# Patient Record
Sex: FEMALE | URBAN - METROPOLITAN AREA
[De-identification: names, ages, dates, MRNs, and addresses within clinical notes are randomized per-mention and may not be internally consistent; named-entity substitution may affect disease eponyms.]

---

## 2019-12-05 ENCOUNTER — APPOINTMENT (RX ONLY)
Dept: URBAN - METROPOLITAN AREA CLINIC 151 | Facility: CLINIC | Age: 34
Setting detail: DERMATOLOGY
End: 2019-12-05

## 2019-12-05 DIAGNOSIS — L70.0 ACNE VULGARIS: ICD-10-CM | Status: INADEQUATELY CONTROLLED

## 2019-12-05 PROCEDURE — 99213 OFFICE O/P EST LOW 20 MIN: CPT

## 2019-12-05 PROCEDURE — ? PRESCRIPTION

## 2019-12-05 PROCEDURE — ? COUNSELING

## 2019-12-05 RX ORDER — SULFACETAMIDE SODIUM, SULFUR 98; 48 MG/G; MG/G
LOTION TOPICAL
Qty: 1 | Refills: 5 | Status: ERX | COMMUNITY
Start: 2019-12-05

## 2019-12-05 RX ORDER — HYDROQUINONE 4 %
CREAM (GRAM) TOPICAL
Qty: 1 | Refills: 3 | Status: ERX | COMMUNITY
Start: 2019-12-05

## 2019-12-05 RX ORDER — CLINDAMYCIN PHOSPHATE 10 MG/ML
SOLUTION TOPICAL
Qty: 1 | Refills: 6 | Status: ERX | COMMUNITY
Start: 2019-12-05

## 2019-12-05 RX ADMIN — Medication: at 00:00

## 2019-12-05 RX ADMIN — SULFACETAMIDE SODIUM, SULFUR: 98; 48 LOTION TOPICAL at 00:00

## 2019-12-05 RX ADMIN — CLINDAMYCIN PHOSPHATE: 10 SOLUTION TOPICAL at 00:00

## 2019-12-05 NOTE — HPI: PIMPLES (ACNE)
How Severe Is Your Acne?: mild
Is This A New Presentation, Or A Follow-Up?: Acne
Additional Comments (Use Complete Sentences): Patient uses unknown gel, and face wash.

## 2019-12-05 NOTE — PROCEDURE: COUNSELING
Topical Retinoid counseling:  Patient advised to apply a pea-sized amount only at bedtime and wait 30 minutes after washing their face before applying.  If too drying, patient may add a non-comedogenic moisturizer. The patient verbalized understanding of the proper use and possible adverse effects of retinoids.  All of the patient's questions and concerns were addressed.
Azithromycin Pregnancy And Lactation Text: This medication is considered safe during pregnancy and is also secreted in breast milk.
Spironolactone Pregnancy And Lactation Text: This medication can cause feminization of the male fetus and should be avoided during pregnancy. The active metabolite is also found in breast milk.
Birth Control Pills Pregnancy And Lactation Text: This medication should be avoided if pregnant and for the first 30 days post-partum.
High Dose Vitamin A Counseling: Side effects reviewed, pt to contact office should one occur.
High Dose Vitamin A Pregnancy And Lactation Text: High dose vitamin A therapy is contraindicated during pregnancy and breast feeding.
Patient Specific Counseling (Will Not Stick From Patient To Patient): Discussed potential treatments, patient is currently trying to get pregnant \\nStart clindamycin solution & hydroquinone \\nOnce she becomes pregnant will stop hydroquinone\\nDiscussed starting mike after pregnancy as it is safe to breastfeed with
Erythromycin Counseling:  I discussed with the patient the risks of erythromycin including but not limited to GI upset, allergic reaction, drug rash, diarrhea, increase in liver enzymes, and yeast infections.
Topical Clindamycin Pregnancy And Lactation Text: This medication is Pregnancy Category B and is considered safe during pregnancy. It is unknown if it is excreted in breast milk.
Include Pregnancy/Lactation Warning?: No
Topical Sulfur Applications Counseling: Topical Sulfur Counseling: Patient counseled that this medication may cause skin irritation or allergic reactions.  In the event of skin irritation, the patient was advised to reduce the amount of the drug applied or use it less frequently.   The patient verbalized understanding of the proper use and possible adverse effects of topical sulfur application.  All of the patient's questions and concerns were addressed.
Dapsone Counseling: I discussed with the patient the risks of dapsone including but not limited to hemolytic anemia, agranulocytosis, rashes, methemoglobinemia, kidney failure, peripheral neuropathy, headaches, GI upset, and liver toxicity.  Patients who start dapsone require monitoring including baseline LFTs and weekly CBCs for the first month, then every month thereafter.  The patient verbalized understanding of the proper use and possible adverse effects of dapsone.  All of the patient's questions and concerns were addressed.
Topical Retinoid Pregnancy And Lactation Text: This medication is Pregnancy Category C. It is unknown if this medication is excreted in breast milk.
Tetracycline Counseling: Patient counseled regarding possible photosensitivity and increased risk for sunburn.  Patient instructed to avoid sunlight, if possible.  When exposed to sunlight, patients should wear protective clothing, sunglasses, and sunscreen.  The patient was instructed to call the office immediately if the following severe adverse effects occur:  hearing changes, easy bruising/bleeding, severe headache, or vision changes.  The patient verbalized understanding of the proper use and possible adverse effects of tetracycline.  All of the patient's questions and concerns were addressed. Patient understands to avoid pregnancy while on therapy due to potential birth defects.
Tetracycline Pregnancy And Lactation Text: This medication is Pregnancy Category D and not consider safe during pregnancy. It is also excreted in breast milk.
Bactrim Counseling:  I discussed with the patient the risks of sulfa antibiotics including but not limited to GI upset, allergic reaction, drug rash, diarrhea, dizziness, photosensitivity, and yeast infections.  Rarely, more serious reactions can occur including but not limited to aplastic anemia, agranulocytosis, methemoglobinemia, blood dyscrasias, liver or kidney failure, lung infiltrates or desquamative/blistering drug rashes.
Dapsone Pregnancy And Lactation Text: This medication is Pregnancy Category C and is not considered safe during pregnancy or breast feeding.
Minocycline Counseling: Patient advised regarding possible photosensitivity and discoloration of the teeth, skin, lips, tongue and gums.  Patient instructed to avoid sunlight, if possible.  When exposed to sunlight, patients should wear protective clothing, sunglasses, and sunscreen.  The patient was instructed to call the office immediately if the following severe adverse effects occur:  hearing changes, easy bruising/bleeding, severe headache, or vision changes.  The patient verbalized understanding of the proper use and possible adverse effects of minocycline.  All of the patient's questions and concerns were addressed.
Erythromycin Pregnancy And Lactation Text: This medication is Pregnancy Category B and is considered safe during pregnancy. It is also excreted in breast milk.
Isotretinoin Counseling: Patient should get monthly blood tests, not donate blood, not drive at night if vision affected, not share medication, and not undergo elective surgery for 6 months after tx completed. Side effects reviewed, pt to contact office should one occur.
Topical Sulfur Applications Pregnancy And Lactation Text: This medication is Pregnancy Category C and has an unknown safety profile during pregnancy. It is unknown if this topical medication is excreted in breast milk.
Tazorac Counseling:  Patient advised that medication is irritating and drying.  Patient may need to apply sparingly and wash off after an hour before eventually leaving it on overnight.  The patient verbalized understanding of the proper use and possible adverse effects of tazorac.  All of the patient's questions and concerns were addressed.
Detail Level: Zone
Tazorac Pregnancy And Lactation Text: This medication is not safe during pregnancy. It is unknown if this medication is excreted in breast milk.
Bactrim Pregnancy And Lactation Text: This medication is Pregnancy Category D and is known to cause fetal risk.  It is also excreted in breast milk.
Benzoyl Peroxide Counseling: Patient counseled that medicine may cause skin irritation and bleach clothing.  In the event of skin irritation, the patient was advised to reduce the amount of the drug applied or use it less frequently.   The patient verbalized understanding of the proper use and possible adverse effects of benzoyl peroxide.  All of the patient's questions and concerns were addressed.
Spironolactone Counseling: Patient advised regarding risks of diarrhea, abdominal pain, hyperkalemia, birth defects (for female patients), liver toxicity and renal toxicity. The patient may need blood work to monitor liver and kidney function and potassium levels while on therapy. The patient verbalized understanding of the proper use and possible adverse effects of spironolactone.  All of the patient's questions and concerns were addressed.
Azithromycin Counseling:  I discussed with the patient the risks of azithromycin including but not limited to GI upset, allergic reaction, drug rash, diarrhea, and yeast infections.
Isotretinoin Pregnancy And Lactation Text: This medication is Pregnancy Category X and is considered extremely dangerous during pregnancy. It is unknown if it is excreted in breast milk.
Birth Control Pills Counseling: Birth Control Pill Counseling: I discussed with the patient the potential side effects of OCPs including but not limited to increased risk of stroke, heart attack, thrombophlebitis, deep venous thrombosis, hepatic adenomas, breast changes, GI upset, headaches, and depression.  The patient verbalized understanding of the proper use and possible adverse effects of OCPs. All of the patient's questions and concerns were addressed.
Doxycycline Counseling:  Patient counseled regarding possible photosensitivity and increased risk for sunburn.  Patient instructed to avoid sunlight, if possible.  When exposed to sunlight, patients should wear protective clothing, sunglasses, and sunscreen.  The patient was instructed to call the office immediately if the following severe adverse effects occur:  hearing changes, easy bruising/bleeding, severe headache, or vision changes.  The patient verbalized understanding of the proper use and possible adverse effects of doxycycline.  All of the patient's questions and concerns were addressed.
Doxycycline Pregnancy And Lactation Text: This medication is Pregnancy Category D and not consider safe during pregnancy. It is also excreted in breast milk but is considered safe for shorter treatment courses.
Topical Clindamycin Counseling: Patient counseled that this medication may cause skin irritation or allergic reactions.  In the event of skin irritation, the patient was advised to reduce the amount of the drug applied or use it less frequently.   The patient verbalized understanding of the proper use and possible adverse effects of clindamycin.  All of the patient's questions and concerns were addressed.
Benzoyl Peroxide Pregnancy And Lactation Text: This medication is Pregnancy Category C. It is unknown if benzoyl peroxide is excreted in breast milk.

## 2019-12-05 NOTE — PROCEDURE: MIPS QUALITY
Detail Level: Detailed
Quality 110: Preventive Care And Screening: Influenza Immunization: Influenza Immunization Ordered or Recommended, but not Administered due to system reason
Quality 431: Preventive Care And Screening: Unhealthy Alcohol Use - Screening: Patient screened for unhealthy alcohol use using a single question and scores less than 2 times per year
Quality 226: Preventive Care And Screening: Tobacco Use: Screening And Cessation Intervention: Patient screened for tobacco use and is an ex/non-smoker
Quality 130: Documentation Of Current Medications In The Medical Record: Current Medications Documented
Quality 131: Pain Assessment And Follow-Up: Pain assessment using a standardized tool is documented as negative, no follow-up plan required

## 2020-04-23 ENCOUNTER — RX ONLY (OUTPATIENT)
Age: 35
Setting detail: RX ONLY
End: 2020-04-23

## 2020-04-23 RX ORDER — SULFACETAMIDE SODIUM, SULFUR 98; 48 MG/G; MG/G
1 LIQUID TOPICAL QD
Qty: 1 | Refills: 2 | Status: ERX | COMMUNITY
Start: 2020-04-23

## 2021-05-27 ENCOUNTER — RX ONLY (OUTPATIENT)
Age: 36
Setting detail: RX ONLY
End: 2021-05-27

## 2021-05-27 ENCOUNTER — APPOINTMENT (RX ONLY)
Dept: URBAN - METROPOLITAN AREA CLINIC 151 | Facility: CLINIC | Age: 36
Setting detail: DERMATOLOGY
End: 2021-05-27

## 2021-05-27 DIAGNOSIS — L70.0 ACNE VULGARIS: ICD-10-CM | Status: INADEQUATELY CONTROLLED

## 2021-05-27 PROCEDURE — ? PRESCRIPTION MEDICATION MANAGEMENT

## 2021-05-27 PROCEDURE — ? DIAGNOSIS COMMENT

## 2021-05-27 PROCEDURE — ? COUNSELING

## 2021-05-27 PROCEDURE — ? PRESCRIPTION

## 2021-05-27 PROCEDURE — 99214 OFFICE O/P EST MOD 30 MIN: CPT

## 2021-05-27 RX ORDER — ERYTHROMYCIN 20 MG/G
GEL TOPICAL
Qty: 1 | Refills: 3 | Status: ERX | COMMUNITY
Start: 2021-05-27

## 2021-05-27 RX ORDER — AZELAIC ACID 0.15 G/G
AEROSOL, FOAM TOPICAL
Qty: 1 | Refills: 3 | Status: ERX | COMMUNITY
Start: 2021-05-27

## 2021-05-27 RX ORDER — AZELAIC ACID 0.15 G/G
GEL TOPICAL
Qty: 1 | Refills: 2 | Status: ERX | COMMUNITY
Start: 2021-05-27

## 2021-05-27 RX ADMIN — AZELAIC ACID: 0.15 AEROSOL, FOAM TOPICAL at 00:00

## 2021-05-27 RX ADMIN — ERYTHROMYCIN: 20 GEL TOPICAL at 00:00

## 2021-05-27 NOTE — PROCEDURE: MIPS QUALITY
Quality 226: Preventive Care And Screening: Tobacco Use: Screening And Cessation Intervention: Patient screened for tobacco use and is an ex/non-smoker
Quality 402: Tobacco Use And Help With Quitting Among Adolescents: Patient screened for tobacco and never smoked
Detail Level: Detailed
Quality 130: Documentation Of Current Medications In The Medical Record: Current Medications Documented
Quality 47: Advance Care Plan: Advance care planning not documented, reason not otherwise specified.
Quality 431: Preventive Care And Screening: Unhealthy Alcohol Use - Screening: Patient screened for unhealthy alcohol use using a single question and scores less than 2 times per year
Quality 134: Screening For Clinical Depression And Follow-Up Plan: The patient was screened for depression and the screen was negative and no follow up required
Quality 110: Preventive Care And Screening: Influenza Immunization: Influenza Immunization Administered during Influenza season

## 2021-05-27 NOTE — HPI: OTHER
Condition:: Acne
Please Describe Your Condition:: Acne/mask acne \\nPt notes breakouts correlated with menstrual cycle. New pimples daily, usually superficial, not cystic.  Pt is currently family planning. Pt notes hydroquinone cream in the past with no significant improvements and rx has . Combo skin per patient. Today is an average/bad day for acne. Pt is only using clindamycin solution, plexion face wash

## 2021-05-27 NOTE — PROCEDURE: COUNSELING
Benzoyl Peroxide Counseling: Patient counseled that medicine may cause skin irritation and bleach clothing.  In the event of skin irritation, the patient was advised to reduce the amount of the drug applied or use it less frequently.   The patient verbalized understanding of the proper use and possible adverse effects of benzoyl peroxide.  All of the patient's questions and concerns were addressed.
High Dose Vitamin A Pregnancy And Lactation Text: High dose vitamin A therapy is contraindicated during pregnancy and breast feeding.
Azithromycin Pregnancy And Lactation Text: This medication is considered safe during pregnancy and is also secreted in breast milk.
Topical Sulfur Applications Pregnancy And Lactation Text: This medication is Pregnancy Category C and has an unknown safety profile during pregnancy. It is unknown if this topical medication is excreted in breast milk.
Sarecycline Pregnancy And Lactation Text: This medication is Pregnancy Category D and not consider safe during pregnancy. It is also excreted in breast milk.
Birth Control Pills Pregnancy And Lactation Text: This medication should be avoided if pregnant and for the first 30 days post-partum.
Erythromycin Counseling:  I discussed with the patient the risks of erythromycin including but not limited to GI upset, allergic reaction, drug rash, diarrhea, increase in liver enzymes, and yeast infections.
Tazorac Counseling:  Patient advised that medication is irritating and drying.  Patient may need to apply sparingly and wash off after an hour before eventually leaving it on overnight.  The patient verbalized understanding of the proper use and possible adverse effects of tazorac.  All of the patient's questions and concerns were addressed.
High Dose Vitamin A Counseling: Side effects reviewed, pt to contact office should one occur.
Minocycline Counseling: Patient advised regarding possible photosensitivity and discoloration of the teeth, skin, lips, tongue and gums.  Patient instructed to avoid sunlight, if possible.  When exposed to sunlight, patients should wear protective clothing, sunglasses, and sunscreen.  The patient was instructed to call the office immediately if the following severe adverse effects occur:  hearing changes, easy bruising/bleeding, severe headache, or vision changes.  The patient verbalized understanding of the proper use and possible adverse effects of minocycline.  All of the patient's questions and concerns were addressed.
Dapsone Pregnancy And Lactation Text: This medication is Pregnancy Category C and is not considered safe during pregnancy or breast feeding.
Benzoyl Peroxide Pregnancy And Lactation Text: This medication is Pregnancy Category C. It is unknown if benzoyl peroxide is excreted in breast milk.
Erythromycin Pregnancy And Lactation Text: This medication is Pregnancy Category B and is considered safe during pregnancy. It is also excreted in breast milk.
Tazorac Pregnancy And Lactation Text: This medication is not safe during pregnancy. It is unknown if this medication is excreted in breast milk.
Bactrim Counseling:  I discussed with the patient the risks of sulfa antibiotics including but not limited to GI upset, allergic reaction, drug rash, diarrhea, dizziness, photosensitivity, and yeast infections.  Rarely, more serious reactions can occur including but not limited to aplastic anemia, agranulocytosis, methemoglobinemia, blood dyscrasias, liver or kidney failure, lung infiltrates or desquamative/blistering drug rashes.
Spironolactone Counseling: Patient advised regarding risks of diarrhea, abdominal pain, hyperkalemia, birth defects (for female patients), liver toxicity and renal toxicity. The patient may need blood work to monitor liver and kidney function and potassium levels while on therapy. The patient verbalized understanding of the proper use and possible adverse effects of spironolactone.  All of the patient's questions and concerns were addressed.
Dapsone Counseling: I discussed with the patient the risks of dapsone including but not limited to hemolytic anemia, agranulocytosis, rashes, methemoglobinemia, kidney failure, peripheral neuropathy, headaches, GI upset, and liver toxicity.  Patients who start dapsone require monitoring including baseline LFTs and weekly CBCs for the first month, then every month thereafter.  The patient verbalized understanding of the proper use and possible adverse effects of dapsone.  All of the patient's questions and concerns were addressed.
Doxycycline Counseling:  Patient counseled regarding possible photosensitivity and increased risk for sunburn.  Patient instructed to avoid sunlight, if possible.  When exposed to sunlight, patients should wear protective clothing, sunglasses, and sunscreen.  The patient was instructed to call the office immediately if the following severe adverse effects occur:  hearing changes, easy bruising/bleeding, severe headache, or vision changes.  The patient verbalized understanding of the proper use and possible adverse effects of doxycycline.  All of the patient's questions and concerns were addressed.
Use Enhanced Medication Counseling?: No
Topical Clindamycin Pregnancy And Lactation Text: This medication is Pregnancy Category B and is considered safe during pregnancy. It is unknown if it is excreted in breast milk.
Bactrim Pregnancy And Lactation Text: This medication is Pregnancy Category D and is known to cause fetal risk.  It is also excreted in breast milk.
Spironolactone Pregnancy And Lactation Text: This medication can cause feminization of the male fetus and should be avoided during pregnancy. The active metabolite is also found in breast milk.
Topical Retinoid counseling:  Patient advised to apply a pea-sized amount only at bedtime and wait 30 minutes after washing their face before applying.  If too drying, patient may add a non-comedogenic moisturizer. The patient verbalized understanding of the proper use and possible adverse effects of retinoids.  All of the patient's questions and concerns were addressed.
Isotretinoin Counseling: Patient should get monthly blood tests, not donate blood, not drive at night if vision affected, not share medication, and not undergo elective surgery for 6 months after tx completed. Side effects reviewed, pt to contact office should one occur.
Topical Clindamycin Counseling: Patient counseled that this medication may cause skin irritation or allergic reactions.  In the event of skin irritation, the patient was advised to reduce the amount of the drug applied or use it less frequently.   The patient verbalized understanding of the proper use and possible adverse effects of clindamycin.  All of the patient's questions and concerns were addressed.
Topical Sulfur Applications Counseling: Topical Sulfur Counseling: Patient counseled that this medication may cause skin irritation or allergic reactions.  In the event of skin irritation, the patient was advised to reduce the amount of the drug applied or use it less frequently.   The patient verbalized understanding of the proper use and possible adverse effects of topical sulfur application.  All of the patient's questions and concerns were addressed.
Doxycycline Pregnancy And Lactation Text: This medication is Pregnancy Category D and not consider safe during pregnancy. It is also excreted in breast milk but is considered safe for shorter treatment courses.
Azithromycin Counseling:  I discussed with the patient the risks of azithromycin including but not limited to GI upset, allergic reaction, drug rash, diarrhea, and yeast infections.
Topical Retinoid Pregnancy And Lactation Text: This medication is Pregnancy Category C. It is unknown if this medication is excreted in breast milk.
Isotretinoin Pregnancy And Lactation Text: This medication is Pregnancy Category X and is considered extremely dangerous during pregnancy. It is unknown if it is excreted in breast milk.
Birth Control Pills Counseling: Birth Control Pill Counseling: I discussed with the patient the potential side effects of OCPs including but not limited to increased risk of stroke, heart attack, thrombophlebitis, deep venous thrombosis, hepatic adenomas, breast changes, GI upset, headaches, and depression.  The patient verbalized understanding of the proper use and possible adverse effects of OCPs. All of the patient's questions and concerns were addressed.
Sarecycline Counseling: Patient advised regarding possible photosensitivity and discoloration of the teeth, skin, lips, tongue and gums.  Patient instructed to avoid sunlight, if possible.  When exposed to sunlight, patients should wear protective clothing, sunglasses, and sunscreen.  The patient was instructed to call the office immediately if the following severe adverse effects occur:  hearing changes, easy bruising/bleeding, severe headache, or vision changes.  The patient verbalized understanding of the proper use and possible adverse effects of sarecycline.  All of the patient's questions and concerns were addressed.
Detail Level: Detailed
Tetracycline Counseling: Patient counseled regarding possible photosensitivity and increased risk for sunburn.  Patient instructed to avoid sunlight, if possible.  When exposed to sunlight, patients should wear protective clothing, sunglasses, and sunscreen.  The patient was instructed to call the office immediately if the following severe adverse effects occur:  hearing changes, easy bruising/bleeding, severe headache, or vision changes.  The patient verbalized understanding of the proper use and possible adverse effects of tetracycline.  All of the patient's questions and concerns were addressed. Patient understands to avoid pregnancy while on therapy due to potential birth defects.

## 2021-05-27 NOTE — PROCEDURE: DIAGNOSIS COMMENT
Comment: Mild activity but diffuse pigmentary changes. Pt denies frequent cystic lesions. Patient is pregnancy planning. Referred to [facility] for facials
Detail Level: Zone
Render Risk Assessment In Note?: no

## 2021-05-27 NOTE — PROCEDURE: PRESCRIPTION MEDICATION MANAGEMENT
Render In Strict Bullet Format?: No
Samples Given: ALEXX Anthelios Tinted sunscreen  SPF50, CeraVe Hydrating Tinted sunscreen SPF30, Finacea
Initiate Treatment: Finacea foam BID\\nErythromycin gel BID
Detail Level: Zone

## 2021-07-29 ENCOUNTER — APPOINTMENT (RX ONLY)
Dept: URBAN - METROPOLITAN AREA CLINIC 151 | Facility: CLINIC | Age: 36
Setting detail: DERMATOLOGY
End: 2021-07-29

## 2021-07-29 DIAGNOSIS — L70.0 ACNE VULGARIS: ICD-10-CM | Status: STABLE

## 2021-07-29 PROCEDURE — 99214 OFFICE O/P EST MOD 30 MIN: CPT

## 2021-07-29 PROCEDURE — ? PRESCRIPTION

## 2021-07-29 PROCEDURE — ? DIAGNOSIS COMMENT

## 2021-07-29 PROCEDURE — ? PRESCRIPTION MEDICATION MANAGEMENT

## 2021-07-29 PROCEDURE — ? COUNSELING

## 2021-07-29 RX ORDER — ADAPALENE AND BENZOYL PEROXIDE 3; 25 MG/G; MG/G
GEL TOPICAL
Qty: 1 | Refills: 5 | Status: ERX | COMMUNITY
Start: 2021-07-29

## 2021-07-29 RX ADMIN — ADAPALENE AND BENZOYL PEROXIDE: 3; 25 GEL TOPICAL at 00:00

## 2021-07-29 NOTE — PROCEDURE: DIAGNOSIS COMMENT
Comment: Mild. Patient notes more frequent breakouts. Patient to discontinue erythromycin 2% gel (notes filmy cast after application). Patient has seen Sarai Baldwin for facials. Finacea foam not covered by insurance thus was substituted with azelaic acid 15% gel- will continue and add Epiduo Forte gel QHS as tolerated (application instructions reviewed, patient aware to discontinue if pregnant). Patient has also been using Plexion cleanser in the morning noting that she likes the exfoliating texture
Detail Level: Zone
Render Risk Assessment In Note?: no

## 2021-07-29 NOTE — PROCEDURE: PRESCRIPTION MEDICATION MANAGEMENT
Discontinue Regimen: Erythromycin gel BID
Render In Strict Bullet Format?: No
Samples Given: Epiduo Forte, Finacea Foam, Neutrogena Gentle cleanser, LRP gentle cleanser, Cetaphil gentle , CeraVe PM moisturizer (patient traveling)
Continue Regimen: Finacea foam BID
Initiate Treatment: Epiduo Forte gel nightly as tolerated
Detail Level: Zone

## 2021-09-23 ENCOUNTER — APPOINTMENT (RX ONLY)
Dept: URBAN - METROPOLITAN AREA CLINIC 151 | Facility: CLINIC | Age: 36
Setting detail: DERMATOLOGY
End: 2021-09-23

## 2021-09-23 DIAGNOSIS — L70.0 ACNE VULGARIS: ICD-10-CM

## 2021-09-23 PROCEDURE — ? PRESCRIPTION MEDICATION MANAGEMENT

## 2021-09-23 PROCEDURE — 99214 OFFICE O/P EST MOD 30 MIN: CPT | Mod: 95

## 2021-09-23 PROCEDURE — ? DIAGNOSIS COMMENT

## 2021-09-23 PROCEDURE — ? CONSENT FOR TELEMEDICINE VISIT OBTAINED

## 2021-09-23 PROCEDURE — ? COUNSELING

## 2021-09-23 PROCEDURE — ? REASON FOR TELEMEDICINE VISIT

## 2021-09-23 NOTE — PROCEDURE: PRESCRIPTION MEDICATION MANAGEMENT
Render In Strict Bullet Format?: No
Plan: Patient to continue alternating Azelaic acid gel and Epiduo Forte gel at night
Continue Regimen: Azelaic acid gel QD\\nEpiduo Forte gel QOHS as tolerated
Detail Level: Zone

## 2021-09-23 NOTE — PROCEDURE: DIAGNOSIS COMMENT
Comment: Mild, improved. Patient has been doing well with Azelaic Acid 15% gel and Epiduo Forte gel (patient alternating applications nightly as she is unable to tolerate Epiduo Forte nightly). Patient is aware to discontinue Epiduo Forte gel once pregnant as she is pregnancy planning. She is currently using La Roche Posay Toleraine hydrating cleanser- due to dryer skin, recommended Eucerin Eczema Relief body cream as facial moisturizer
Detail Level: Zone
Render Risk Assessment In Note?: no

## 2021-11-08 PROBLEM — Z31.41 FERTILITY TESTING: Status: ACTIVE | Noted: 2021-11-08

## 2021-11-08 PROBLEM — E04.9 ENLARGED THYROID: Status: ACTIVE | Noted: 2021-11-08

## 2022-01-05 PROBLEM — E03.1 CONGENITAL HYPOTHYROIDISM WITHOUT GOITER: Status: ACTIVE | Noted: 2022-01-05

## 2022-01-05 PROBLEM — E66.9 OBESITY (BMI 35.0-39.9 WITHOUT COMORBIDITY): Status: ACTIVE | Noted: 2022-01-05

## 2022-04-05 ENCOUNTER — LAB ENCOUNTER (OUTPATIENT)
Dept: LAB | Facility: HOSPITAL | Age: 37
End: 2022-04-05
Attending: INTERNAL MEDICINE
Payer: COMMERCIAL

## 2022-04-05 DIAGNOSIS — R63.5 ABNORMAL WEIGHT GAIN: ICD-10-CM

## 2022-04-05 DIAGNOSIS — K90.41 GLUTEN INTOLERANCE: ICD-10-CM

## 2022-04-05 PROBLEM — E88.81 INSULIN RESISTANCE: Status: ACTIVE | Noted: 2022-04-05

## 2022-04-05 PROBLEM — R73.03 PREDIABETES: Status: ACTIVE | Noted: 2022-04-05

## 2022-04-05 PROBLEM — E88.819 INSULIN RESISTANCE: Status: ACTIVE | Noted: 2022-04-05

## 2022-04-05 LAB
ALBUMIN SERPL-MCNC: 3.8 G/DL (ref 3.4–5)
ALBUMIN/GLOB SERPL: 1.1 {RATIO} (ref 1–2)
ALP LIVER SERPL-CCNC: 71 U/L
ALT SERPL-CCNC: 27 U/L
ANION GAP SERPL CALC-SCNC: 3 MMOL/L (ref 0–18)
AST SERPL-CCNC: 25 U/L (ref 15–37)
BASOPHILS # BLD AUTO: 0.1 X10(3) UL (ref 0–0.2)
BASOPHILS NFR BLD AUTO: 1.8 %
BILIRUB SERPL-MCNC: 0.6 MG/DL (ref 0.1–2)
BUN BLD-MCNC: 11 MG/DL (ref 7–18)
BUN/CREAT SERPL: 10.3 (ref 10–20)
CALCIUM BLD-MCNC: 9.3 MG/DL (ref 8.5–10.1)
CHLORIDE SERPL-SCNC: 107 MMOL/L (ref 98–112)
CO2 SERPL-SCNC: 27 MMOL/L (ref 21–32)
CREAT BLD-MCNC: 1.07 MG/DL
DEPRECATED RDW RBC AUTO: 45.7 FL (ref 35.1–46.3)
EOSINOPHIL # BLD AUTO: 0.1 X10(3) UL (ref 0–0.7)
EOSINOPHIL NFR BLD AUTO: 1.8 %
ERYTHROCYTE [DISTWIDTH] IN BLOOD BY AUTOMATED COUNT: 14.5 % (ref 11–15)
EST. AVERAGE GLUCOSE BLD GHB EST-MCNC: 123 MG/DL (ref 68–126)
FASTING STATUS PATIENT QL REPORTED: YES
GLOBULIN PLAS-MCNC: 3.6 G/DL (ref 2.8–4.4)
GLUCOSE BLD-MCNC: 94 MG/DL (ref 70–99)
HBA1C MFR BLD: 5.9 % (ref ?–5.7)
HCT VFR BLD AUTO: 41.8 %
HGB BLD-MCNC: 13.9 G/DL
IMM GRANULOCYTES # BLD AUTO: 0.02 X10(3) UL (ref 0–1)
IMM GRANULOCYTES NFR BLD: 0.4 %
INSULIN SERPL-ACNC: 20.5 MU/L (ref 3–25)
LYMPHOCYTES # BLD AUTO: 2.15 X10(3) UL (ref 1–4)
LYMPHOCYTES NFR BLD AUTO: 39.2 %
MCH RBC QN AUTO: 29 PG (ref 26–34)
MCHC RBC AUTO-ENTMCNC: 33.3 G/DL (ref 31–37)
MCV RBC AUTO: 87.1 FL
MONOCYTES # BLD AUTO: 0.38 X10(3) UL (ref 0.1–1)
MONOCYTES NFR BLD AUTO: 6.9 %
NEUTROPHILS # BLD AUTO: 2.74 X10 (3) UL (ref 1.5–7.7)
NEUTROPHILS # BLD AUTO: 2.74 X10(3) UL (ref 1.5–7.7)
NEUTROPHILS NFR BLD AUTO: 49.9 %
OSMOLALITY SERPL CALC.SUM OF ELEC: 283 MOSM/KG (ref 275–295)
PLATELET # BLD AUTO: 248 10(3)UL (ref 150–450)
POTASSIUM SERPL-SCNC: 4.2 MMOL/L (ref 3.5–5.1)
PROT SERPL-MCNC: 7.4 G/DL (ref 6.4–8.2)
RBC # BLD AUTO: 4.8 X10(6)UL
SODIUM SERPL-SCNC: 137 MMOL/L (ref 136–145)
TSI SER-ACNC: 1.05 MIU/ML (ref 0.36–3.74)
WBC # BLD AUTO: 5.5 X10(3) UL (ref 4–11)

## 2022-04-05 PROCEDURE — 80053 COMPREHEN METABOLIC PANEL: CPT

## 2022-04-05 PROCEDURE — 36415 COLL VENOUS BLD VENIPUNCTURE: CPT

## 2022-04-05 PROCEDURE — 86003 ALLG SPEC IGE CRUDE XTRC EA: CPT

## 2022-04-05 PROCEDURE — 83525 ASSAY OF INSULIN: CPT

## 2022-04-05 PROCEDURE — 83036 HEMOGLOBIN GLYCOSYLATED A1C: CPT

## 2022-04-05 PROCEDURE — 84443 ASSAY THYROID STIM HORMONE: CPT

## 2022-04-05 PROCEDURE — 82397 CHEMILUMINESCENT ASSAY: CPT

## 2022-04-05 PROCEDURE — 85025 COMPLETE CBC W/AUTO DIFF WBC: CPT

## 2022-04-05 PROCEDURE — 86364 TISS TRNSGLTMNASE EA IG CLAS: CPT

## 2022-04-05 PROCEDURE — 86256 FLUORESCENT ANTIBODY TITER: CPT

## 2022-04-08 LAB
LEPTIN: 34.9 NG/ML
TTG IGA SER-ACNC: 0.2 U/ML (ref ?–7)
WHEAT IGE QN: <0.1 KUA/L (ref ?–0.1)

## 2023-01-18 LAB
AMB EXT HEMATOCRIT: 37.4
AMB EXT HEMOGLOBIN: 12.9
AMB EXT PLATELETS: 263
AMB EXT TREPONEMAL ANTIBODIES: NEGATIVE
ANTIBODY SCREEN OB: NEGATIVE
HEPATITIS B SURFACE ANTIGEN OB: NEGATIVE
HIV RESULT OB: NEGATIVE
RH FACTOR OB: POSITIVE

## 2023-06-30 LAB
AMB EXT HEMATOCRIT: 36.4
AMB EXT HEMOGLOBIN: 12
HIV RESULT OB: NEGATIVE

## 2023-07-31 LAB — STREP GP B CULT OB: NEGATIVE

## 2023-08-09 ENCOUNTER — TELEPHONE (OUTPATIENT)
Dept: OBGYN UNIT | Facility: HOSPITAL | Age: 38
End: 2023-08-09

## 2023-08-11 ENCOUNTER — ANESTHESIA EVENT (OUTPATIENT)
Dept: OBGYN UNIT | Facility: HOSPITAL | Age: 38
End: 2023-08-11
Payer: COMMERCIAL

## 2023-08-11 ENCOUNTER — ANESTHESIA (OUTPATIENT)
Dept: OBGYN UNIT | Facility: HOSPITAL | Age: 38
End: 2023-08-11
Payer: COMMERCIAL

## 2023-08-11 ENCOUNTER — HOSPITAL ENCOUNTER (INPATIENT)
Facility: HOSPITAL | Age: 38
LOS: 4 days | Discharge: HOME | End: 2023-08-15
Attending: OBSTETRICS & GYNECOLOGY | Admitting: OBSTETRICS & GYNECOLOGY
Payer: COMMERCIAL

## 2023-08-11 PROBLEM — Z34.90 PREGNANCY: Status: ACTIVE | Noted: 2023-08-11

## 2023-08-11 PROBLEM — Z34.90 PREGNANCY (HCC): Status: ACTIVE | Noted: 2023-08-11

## 2023-08-11 LAB
ANTIBODY SCREEN: NEGATIVE
BASOPHILS # BLD AUTO: 0.07 X10(3) UL (ref 0–0.2)
BASOPHILS NFR BLD AUTO: 0.7 %
DEPRECATED RDW RBC AUTO: 44.1 FL (ref 35.1–46.3)
EOSINOPHIL # BLD AUTO: 0.11 X10(3) UL (ref 0–0.7)
EOSINOPHIL NFR BLD AUTO: 1.2 %
ERYTHROCYTE [DISTWIDTH] IN BLOOD BY AUTOMATED COUNT: 14.2 % (ref 11–15)
GLUCOSE BLDC GLUCOMTR-MCNC: 170 MG/DL (ref 70–99)
HCT VFR BLD AUTO: 36.8 %
HGB BLD-MCNC: 12.9 G/DL
IMM GRANULOCYTES # BLD AUTO: 0.1 X10(3) UL (ref 0–1)
IMM GRANULOCYTES NFR BLD: 1.1 %
LYMPHOCYTES # BLD AUTO: 2.04 X10(3) UL (ref 1–4)
LYMPHOCYTES NFR BLD AUTO: 21.7 %
MCH RBC QN AUTO: 29.9 PG (ref 26–34)
MCHC RBC AUTO-ENTMCNC: 35.1 G/DL (ref 31–37)
MCV RBC AUTO: 85.4 FL
MONOCYTES # BLD AUTO: 0.94 X10(3) UL (ref 0.1–1)
MONOCYTES NFR BLD AUTO: 10 %
NEUTROPHILS # BLD AUTO: 6.12 X10 (3) UL (ref 1.5–7.7)
NEUTROPHILS # BLD AUTO: 6.12 X10(3) UL (ref 1.5–7.7)
NEUTROPHILS NFR BLD AUTO: 65.3 %
PLATELET # BLD AUTO: 239 10(3)UL (ref 150–450)
RBC # BLD AUTO: 4.31 X10(6)UL
RH BLOOD TYPE: POSITIVE
WBC # BLD AUTO: 9.4 X10(3) UL (ref 4–11)

## 2023-08-11 PROCEDURE — S0028 INJECTION, FAMOTIDINE, 20 MG: HCPCS | Performed by: OBSTETRICS & GYNECOLOGY

## 2023-08-11 PROCEDURE — 86901 BLOOD TYPING SEROLOGIC RH(D): CPT | Performed by: OBSTETRICS & GYNECOLOGY

## 2023-08-11 PROCEDURE — 86850 RBC ANTIBODY SCREEN: CPT | Performed by: OBSTETRICS & GYNECOLOGY

## 2023-08-11 PROCEDURE — 88307 TISSUE EXAM BY PATHOLOGIST: CPT | Performed by: OBSTETRICS & GYNECOLOGY

## 2023-08-11 PROCEDURE — 85025 COMPLETE CBC W/AUTO DIFF WBC: CPT | Performed by: OBSTETRICS & GYNECOLOGY

## 2023-08-11 PROCEDURE — 82962 GLUCOSE BLOOD TEST: CPT

## 2023-08-11 PROCEDURE — 86900 BLOOD TYPING SEROLOGIC ABO: CPT | Performed by: OBSTETRICS & GYNECOLOGY

## 2023-08-11 RX ORDER — IBUPROFEN 600 MG/1
600 TABLET ORAL EVERY 6 HOURS
Status: DISCONTINUED | OUTPATIENT
Start: 2023-08-12 | End: 2023-08-15

## 2023-08-11 RX ORDER — METOCLOPRAMIDE 10 MG/1
10 TABLET ORAL ONCE
Status: COMPLETED | OUTPATIENT
Start: 2023-08-11 | End: 2023-08-11

## 2023-08-11 RX ORDER — MORPHINE SULFATE 1 MG/ML
INJECTION, SOLUTION EPIDURAL; INTRATHECAL; INTRAVENOUS
Status: COMPLETED | OUTPATIENT
Start: 2023-08-11 | End: 2023-08-11

## 2023-08-11 RX ORDER — AMMONIA INHALANTS 0.04 G/.3ML
0.3 INHALANT RESPIRATORY (INHALATION) AS NEEDED
Status: DISCONTINUED | OUTPATIENT
Start: 2023-08-11 | End: 2023-08-15

## 2023-08-11 RX ORDER — DIPHENHYDRAMINE HCL 25 MG
25 CAPSULE ORAL EVERY 4 HOURS PRN
Status: DISPENSED | OUTPATIENT
Start: 2023-08-11 | End: 2023-08-12

## 2023-08-11 RX ORDER — DIPHENHYDRAMINE HYDROCHLORIDE 50 MG/ML
25 INJECTION INTRAMUSCULAR; INTRAVENOUS ONCE AS NEEDED
Status: ACTIVE | OUTPATIENT
Start: 2023-08-11 | End: 2023-08-11

## 2023-08-11 RX ORDER — SIMETHICONE 80 MG
80 TABLET,CHEWABLE ORAL 3 TIMES DAILY PRN
Status: DISCONTINUED | OUTPATIENT
Start: 2023-08-11 | End: 2023-08-15

## 2023-08-11 RX ORDER — ONDANSETRON 2 MG/ML
4 INJECTION INTRAMUSCULAR; INTRAVENOUS EVERY 6 HOURS PRN
Status: DISCONTINUED | OUTPATIENT
Start: 2023-08-11 | End: 2023-08-11 | Stop reason: HOSPADM

## 2023-08-11 RX ORDER — TRISODIUM CITRATE DIHYDRATE AND CITRIC ACID MONOHYDRATE 500; 334 MG/5ML; MG/5ML
30 SOLUTION ORAL ONCE
Status: COMPLETED | OUTPATIENT
Start: 2023-08-11 | End: 2023-08-11

## 2023-08-11 RX ORDER — DEXTROSE, SODIUM CHLORIDE, SODIUM LACTATE, POTASSIUM CHLORIDE, AND CALCIUM CHLORIDE 5; .6; .31; .03; .02 G/100ML; G/100ML; G/100ML; G/100ML; G/100ML
INJECTION, SOLUTION INTRAVENOUS CONTINUOUS
Status: DISCONTINUED | OUTPATIENT
Start: 2023-08-11 | End: 2023-08-15

## 2023-08-11 RX ORDER — OXYCODONE HYDROCHLORIDE 5 MG/1
5 TABLET ORAL EVERY 6 HOURS PRN
Status: DISCONTINUED | OUTPATIENT
Start: 2023-08-11 | End: 2023-08-15

## 2023-08-11 RX ORDER — ASPIRIN 81 MG/1
81 TABLET ORAL DAILY
COMMUNITY
End: 2023-08-15

## 2023-08-11 RX ORDER — FAMOTIDINE 20 MG/1
20 TABLET, FILM COATED ORAL ONCE
Status: COMPLETED | OUTPATIENT
Start: 2023-08-11 | End: 2023-08-11

## 2023-08-11 RX ORDER — SODIUM CHLORIDE, SODIUM LACTATE, POTASSIUM CHLORIDE, CALCIUM CHLORIDE 600; 310; 30; 20 MG/100ML; MG/100ML; MG/100ML; MG/100ML
INJECTION, SOLUTION INTRAVENOUS CONTINUOUS
Status: DISCONTINUED | OUTPATIENT
Start: 2023-08-11 | End: 2023-08-15

## 2023-08-11 RX ORDER — FAMOTIDINE 10 MG/ML
20 INJECTION, SOLUTION INTRAVENOUS ONCE
Status: COMPLETED | OUTPATIENT
Start: 2023-08-11 | End: 2023-08-11

## 2023-08-11 RX ORDER — SODIUM CHLORIDE, SODIUM LACTATE, POTASSIUM CHLORIDE, CALCIUM CHLORIDE 600; 310; 30; 20 MG/100ML; MG/100ML; MG/100ML; MG/100ML
125 INJECTION, SOLUTION INTRAVENOUS CONTINUOUS
Status: DISCONTINUED | OUTPATIENT
Start: 2023-08-11 | End: 2023-08-11 | Stop reason: HOSPADM

## 2023-08-11 RX ORDER — NALOXONE HYDROCHLORIDE 0.4 MG/ML
0.08 INJECTION, SOLUTION INTRAMUSCULAR; INTRAVENOUS; SUBCUTANEOUS
Status: ACTIVE | OUTPATIENT
Start: 2023-08-11 | End: 2023-08-12

## 2023-08-11 RX ORDER — LEVOTHYROXINE SODIUM 0.05 MG/1
50 TABLET ORAL
Status: DISCONTINUED | OUTPATIENT
Start: 2023-08-11 | End: 2023-08-15

## 2023-08-11 RX ORDER — ACETAMINOPHEN 500 MG
1000 TABLET ORAL ONCE
Status: COMPLETED | OUTPATIENT
Start: 2023-08-11 | End: 2023-08-11

## 2023-08-11 RX ORDER — DOCUSATE SODIUM 100 MG/1
100 CAPSULE, LIQUID FILLED ORAL
Status: DISCONTINUED | OUTPATIENT
Start: 2023-08-11 | End: 2023-08-15

## 2023-08-11 RX ORDER — BISACODYL 10 MG
10 SUPPOSITORY, RECTAL RECTAL ONCE AS NEEDED
Status: DISCONTINUED | OUTPATIENT
Start: 2023-08-11 | End: 2023-08-15

## 2023-08-11 RX ORDER — ACETAMINOPHEN 500 MG
1000 TABLET ORAL EVERY 6 HOURS
Status: DISCONTINUED | OUTPATIENT
Start: 2023-08-11 | End: 2023-08-15

## 2023-08-11 RX ORDER — METOCLOPRAMIDE HYDROCHLORIDE 5 MG/ML
10 INJECTION INTRAMUSCULAR; INTRAVENOUS ONCE
Status: COMPLETED | OUTPATIENT
Start: 2023-08-11 | End: 2023-08-11

## 2023-08-11 RX ORDER — CEFAZOLIN SODIUM/WATER 2 G/20 ML
2 SYRINGE (ML) INTRAVENOUS ONCE
Status: COMPLETED | OUTPATIENT
Start: 2023-08-11 | End: 2023-08-11

## 2023-08-11 RX ORDER — PROCHLORPERAZINE EDISYLATE 5 MG/ML
5 INJECTION INTRAMUSCULAR; INTRAVENOUS ONCE AS NEEDED
Status: ACTIVE | OUTPATIENT
Start: 2023-08-11 | End: 2023-08-11

## 2023-08-11 RX ORDER — NALBUPHINE HYDROCHLORIDE 10 MG/ML
2.5 INJECTION, SOLUTION INTRAMUSCULAR; INTRAVENOUS; SUBCUTANEOUS EVERY 4 HOURS PRN
Status: ACTIVE | OUTPATIENT
Start: 2023-08-11 | End: 2023-08-12

## 2023-08-11 RX ORDER — HYDROCODONE BITARTRATE AND ACETAMINOPHEN 7.5; 325 MG/1; MG/1
2 TABLET ORAL EVERY 6 HOURS PRN
Status: ACTIVE | OUTPATIENT
Start: 2023-08-11 | End: 2023-08-12

## 2023-08-11 RX ORDER — LIDOCAINE HYDROCHLORIDE 10 MG/ML
INJECTION, SOLUTION INFILTRATION; PERINEURAL
Status: COMPLETED | OUTPATIENT
Start: 2023-08-11 | End: 2023-08-11

## 2023-08-11 RX ORDER — ONDANSETRON 2 MG/ML
4 INJECTION INTRAMUSCULAR; INTRAVENOUS ONCE AS NEEDED
Status: ACTIVE | OUTPATIENT
Start: 2023-08-11 | End: 2023-08-11

## 2023-08-11 RX ORDER — KETOROLAC TROMETHAMINE 30 MG/ML
30 INJECTION, SOLUTION INTRAMUSCULAR; INTRAVENOUS EVERY 6 HOURS
Status: DISPENSED | OUTPATIENT
Start: 2023-08-11 | End: 2023-08-12

## 2023-08-11 RX ORDER — CHOLECALCIFEROL (VITAMIN D3) 25 MCG
1 TABLET,CHEWABLE ORAL DAILY
Status: DISCONTINUED | OUTPATIENT
Start: 2023-08-11 | End: 2023-08-15

## 2023-08-11 RX ORDER — EPHEDRINE SULFATE 50 MG/ML
INJECTION INTRAVENOUS AS NEEDED
Status: DISCONTINUED | OUTPATIENT
Start: 2023-08-11 | End: 2023-08-11 | Stop reason: SURG

## 2023-08-11 RX ORDER — NALBUPHINE HYDROCHLORIDE 10 MG/ML
2.5 INJECTION, SOLUTION INTRAMUSCULAR; INTRAVENOUS; SUBCUTANEOUS
Status: DISCONTINUED | OUTPATIENT
Start: 2023-08-11 | End: 2023-08-11

## 2023-08-11 RX ORDER — KETOROLAC TROMETHAMINE 30 MG/ML
30 INJECTION, SOLUTION INTRAMUSCULAR; INTRAVENOUS ONCE AS NEEDED
Status: COMPLETED | OUTPATIENT
Start: 2023-08-11 | End: 2023-08-11

## 2023-08-11 RX ORDER — HYDROMORPHONE HYDROCHLORIDE 1 MG/ML
0.6 INJECTION, SOLUTION INTRAMUSCULAR; INTRAVENOUS; SUBCUTANEOUS
Status: ACTIVE | OUTPATIENT
Start: 2023-08-11 | End: 2023-08-12

## 2023-08-11 RX ORDER — HYDROCODONE BITARTRATE AND ACETAMINOPHEN 7.5; 325 MG/1; MG/1
1 TABLET ORAL EVERY 6 HOURS PRN
Status: ACTIVE | OUTPATIENT
Start: 2023-08-11 | End: 2023-08-12

## 2023-08-11 RX ORDER — DIPHENHYDRAMINE HYDROCHLORIDE 50 MG/ML
12.5 INJECTION INTRAMUSCULAR; INTRAVENOUS EVERY 4 HOURS PRN
Status: ACTIVE | OUTPATIENT
Start: 2023-08-11 | End: 2023-08-12

## 2023-08-11 RX ORDER — BUPIVACAINE HYDROCHLORIDE 7.5 MG/ML
INJECTION, SOLUTION INTRASPINAL
Status: COMPLETED | OUTPATIENT
Start: 2023-08-11 | End: 2023-08-11

## 2023-08-11 RX ORDER — HYDROMORPHONE HYDROCHLORIDE 1 MG/ML
0.3 INJECTION, SOLUTION INTRAMUSCULAR; INTRAVENOUS; SUBCUTANEOUS EVERY 2 HOUR PRN
Status: DISCONTINUED | OUTPATIENT
Start: 2023-08-11 | End: 2023-08-15

## 2023-08-11 RX ORDER — ONDANSETRON 2 MG/ML
4 INJECTION INTRAMUSCULAR; INTRAVENOUS EVERY 6 HOURS PRN
Status: DISCONTINUED | OUTPATIENT
Start: 2023-08-11 | End: 2023-08-15

## 2023-08-11 RX ORDER — GABAPENTIN 300 MG/1
300 CAPSULE ORAL EVERY 8 HOURS PRN
Status: DISCONTINUED | OUTPATIENT
Start: 2023-08-11 | End: 2023-08-15

## 2023-08-11 RX ORDER — HALOPERIDOL 5 MG/ML
0.5 INJECTION INTRAMUSCULAR ONCE AS NEEDED
Status: ACTIVE | OUTPATIENT
Start: 2023-08-11 | End: 2023-08-11

## 2023-08-11 RX ORDER — HYDROMORPHONE HYDROCHLORIDE 1 MG/ML
0.4 INJECTION, SOLUTION INTRAMUSCULAR; INTRAVENOUS; SUBCUTANEOUS
Status: ACTIVE | OUTPATIENT
Start: 2023-08-11 | End: 2023-08-12

## 2023-08-11 RX ORDER — ONDANSETRON 2 MG/ML
INJECTION INTRAMUSCULAR; INTRAVENOUS AS NEEDED
Status: DISCONTINUED | OUTPATIENT
Start: 2023-08-11 | End: 2023-08-11 | Stop reason: SURG

## 2023-08-11 RX ORDER — ACETAMINOPHEN 325 MG/1
650 TABLET ORAL EVERY 6 HOURS PRN
Status: ACTIVE | OUTPATIENT
Start: 2023-08-11 | End: 2023-08-12

## 2023-08-11 RX ADMIN — EPHEDRINE SULFATE 10 MG: 50 INJECTION INTRAVENOUS at 08:04:00

## 2023-08-11 RX ADMIN — MORPHINE SULFATE 0.3 MG: 1 INJECTION, SOLUTION EPIDURAL; INTRATHECAL; INTRAVENOUS at 07:52:00

## 2023-08-11 RX ADMIN — CEFAZOLIN SODIUM/WATER 2 G: 2 G/20 ML SYRINGE (ML) INTRAVENOUS at 07:58:00

## 2023-08-11 RX ADMIN — EPHEDRINE SULFATE 10 MG: 50 INJECTION INTRAVENOUS at 08:26:00

## 2023-08-11 RX ADMIN — ONDANSETRON 4 MG: 2 INJECTION INTRAMUSCULAR; INTRAVENOUS at 08:26:00

## 2023-08-11 RX ADMIN — BUPIVACAINE HYDROCHLORIDE 1.5 ML: 7.5 INJECTION, SOLUTION INTRASPINAL at 07:52:00

## 2023-08-11 RX ADMIN — SODIUM CHLORIDE, SODIUM LACTATE, POTASSIUM CHLORIDE, CALCIUM CHLORIDE: 600; 310; 30; 20 INJECTION, SOLUTION INTRAVENOUS at 07:45:00

## 2023-08-11 RX ADMIN — LIDOCAINE HYDROCHLORIDE 5 ML: 10 INJECTION, SOLUTION INFILTRATION; PERINEURAL at 07:45:00

## 2023-08-11 RX ADMIN — SODIUM CHLORIDE, SODIUM LACTATE, POTASSIUM CHLORIDE, CALCIUM CHLORIDE: 600; 310; 30; 20 INJECTION, SOLUTION INTRAVENOUS at 08:53:00

## 2023-08-11 NOTE — ANESTHESIA POSTPROCEDURE EVALUATION
Patient: Vani Latham, PhD    Procedure Summary       Date: 23 Room / Location: [address] [place] [address] L+D OR  [facility] L+D OR    Anesthesia Start:  Anesthesia Stop: 7736    Procedure:  SECTION (Abdomen) Diagnosis: (Primary C/S A2GDM, MACROSOMIA)    Surgeons: Rohith Willson MD Anesthesiologist: Joe Ashton MD    Anesthesia Type: spinal ASA Status: 3            Anesthesia Type: spinal    Vitals Value Taken Time   /77 23 0905   Temp 98 23 0905   Pulse 86 23 0905   Resp 16 23 0905   SpO2 98 23 0905       [facility] AN Post Evaluation:   Patient Evaluated in PACU  Patient Participation: complete - patient participated  Level of Consciousness: awake  Pain Management: adequate  Airway Patency:patent  Dental exam unchanged from preop  Yes    Cardiovascular Status: acceptable  Respiratory Status: acceptable  Postoperative Hydration acceptable      Penny Redd MD  2023 9:05 AM

## 2023-08-11 NOTE — H&P
[facility]    History & Physical    Charles Mueller, PhD Patient Status:  Inpatient    1985 MRN [id number]   Location [address] Attending Ayde Chaudhary MD   Hosp Day # 0 PCP None Pcp     Date of Admission:  2023      HPI:   Charles Charlottejoe, PhD is a 44year old [address] [place] female, current EGA of 37w5d with an estimated date of delivery of: 2023, by Ultrasound who presents for primary LTCS due to [facility] and suspected macrosomia. Prenatal care has been since the first trimester. She has been followed closely by MFM and [facility]. Last MFM ultrasound 23 ceph, 3712g, 8#3oz, 95%, FIDEL 22.7. Fetal Movement reported as good. GBS negative Rh positive. History   Obstetric History:   OB History    Para Term  AB Living   1 0 0 0 0 0   SAB IAB Ectopic Multiple Live Births   0 0 0 0 0      # Outcome Date GA Lbr Tommy/2nd Weight Sex Delivery Anes PTL Lv   1 Current                Gyne History: Cervical Papanicolaou done within 1 year of adm    Past Medical History:   Past Medical History:   Diagnosis Date    Chronic constipation     Gluten intolerance     Hypothyroid     Insulin resistance 2022    Prediabetes 2022    Snoring        Past Surgerical History:   Past Surgical History:   Procedure Laterality Date    ROTATOR CUFF REPAIR      Right Shoulder     WISDOM TEETH REMOVED         Social History: Social History    Tobacco Use      Smoking status: Light Smoker      Smokeless tobacco: Never    Alcohol use: Yes      Comment: 1 Drink every other week        Allergies/Medications: Allergies:   No Known Allergies    Medications:  Prenatal MV-Min-Fe Fum-FA-DHA (PRENATAL MULTI +DHA) 27-0.8-200 MG Oral Cap, , Disp: , Rfl:   levothyroxine 50 MCG Oral Tab, Take 50 mcg by mouth before breakfast., Disp: , Rfl:   Ferrous Sulfate Dried (HIGH POTENCY IRON) 65 MG Oral Tab, 1 tablet daily. , Disp: , Rfl:   Multiple Vitamin (MULTIVITAMIN ADULT OR), Take by mouth., Disp: , Rfl:   Nutritional Supplements (VITAMIN D BOOSTER OR), Take by mouth., Disp: , Rfl:           Review of Systems:   As documented in HPI      Physical Exam:        Constitutional: alert and cooperative in No distress    Abdomen: gravid nontender, EFW 9 lbs, vertex    Vaginal exam: deferred    FHT assessment:   Moderate variability, (+) accels, no decels    Results:   No results found for this or any previous visit (from the past 24 hour(s)). No results found. Assessment/Plan:   IUP 37w5d with GDMA2, suspected macrosomia, for primary LTCS    Treatment Plan:  Pt was offered IOL vs primary LTCS and she desires proceeding with primary LTCS. Risks, benefits, alternatives and possible complications have been discussed in detail with the patient including risks of infection, bleeding and damage to internal organs. Pre-admission, admission, and post admission procedures and expectations were discussed in detail.     All questions answered; all appropriate consents will be signed at the Kassie Sanchez MD  8/11/2023  6:12 AM

## 2023-08-11 NOTE — OPERATIVE REPORT
[facility]     Section Delivery / Operative Note    Dawn Johnson, PhD Patient Status:  Inpatient    1985 MRN [id number]   Location [address] Attending Emir Rodriges MD   Hosp Day # 0 PCP None Pcp     Pre Op Diagnosis:  IUP at 39 wks, AMA, Chi Rondon, suspected macrosomia    Post Op Diagnosis:  Same as pre-op dx    Procedure:  Primary LTCS    Surgeon:  Della Tenorio MD    Assistant Surgeon:  Santa Foreman MD necessary for adequate visualization and delivery on infant     Anesthesia: spinal by Dr. Fely Garay    Complications: none     Antibiotics:  Ancef 2 grams preoperatively    EBL: 800cc    Specimens:   Placenta to pathology    Findings: fibroid uterus, normal tubes bilaterally, normal ovaries bilaterally. Live male infant, Apgars 9 & 9, weight 9 lbs, 9 oz Nuchal Cord:  none  clear amniotic fluid noted. Infant:  Date of Delivery: 2023   Time of Delivery: 8:15 AM  Delivery Type: Caesarean Section    Infant Sex  Information for the patient's : Marionette Prader [[id number]]   male    Infant Birthweight  Information for the patient's : Marionette Prader [[id number]]   9 lb 9.4 oz (4.35 kg)     Presentation    Position          Apgars:  1 minute: 9               5 minutes: 9                        10 minutes:      Placenta:  Date/Time of Delivery: 2023  8:16 AM   Delivery: spontaneous  Placenta to Pathology: yes    Cord:  Cord Gases Submitted: no  Cord Blood/Tissue Collection: no  Cord Complications: none      Sponge and Needle Counts:  Verified      Operative note: The patient was prepped and draped in the normal usual sterile fashion after SCDs & little catheter placed. A time out was performed. After adequate level of anesthesia was ascertained, a Pfannenstiel skin incision was made and extended down to the level of the fascia. The fascia was incised and extended bilaterally with curved Treviño scissors.   The rectus muscles were  superiorly and inferiorly. The peritoneal cavity was entered with blunt dissection superiorly and extended inferiorly, with good visualization of bladder at all times. A bladder blade was inserted, bladder flap created and bladder blade replaced. The uterus was scored in the midline. clear encountered. The lower uterine incision was extended laterally & superiorly bluntly. The infant's head was guided through the incision while fundal pressure was applied by assistant. The infant's mouth & naso cavities were bulb suctioned. No The remainder of the body was delivered without complication. The infant was vigorously crying. The cord was doubly clamped and cut. The infant was shown to the parents and placed in the radiant warmer. Cord blood was obtained. Manual removal of placenta was performed. The uterus was externalized. Uterus, tubes and ovaries were normal in appearance. The uterine cavity was cleaned of all clots and debris using a wet lap. The cervix was dilated with a ring forcep which was then passed off of the field. The bladder blade was replaced. The edges of uterine incision was grasped with ring forceps. The uterus was closed in two layer fashion, first being interlocking, the second being imbricating using 0-Vicryl. The incision was inspected for hemostasis. The cul de sac was cleared of all clots / debris. The uterus was replaced into the abdominal cavity and the gutters were swept clean. Re-inspection of the hysterotomy, peritomeum and rectus muscles noted them to be entirely hemostatic. The fascia was closed in two halves using 0 Vicryl. The subcutaneous tissue was copiously irrigated and any bleeding cauterized. The subcutaneous tissue was closed using 2.0 plain gut. The skin was closed using 4-0 vicryl with steristrips applied. Pressure dressing was applied. All sponge, instrument and needle counts were correct x 2.   The patient tolerated the procedure well and was taken to recovery room in alert & stable fashion.       Rosemary Arzate MD  8/11/2023  9:01 AM

## 2023-08-11 NOTE — ANESTHESIA PROCEDURE NOTES
Spinal Block    Date/Time: 8/11/2023 7:45 AM    Performed by: Tonita Seip, MD  Authorized by: Tonita Seip, MD      General Information and Staff    Start Time:  8/11/2023 7:45 AM  End Time:  8/11/2023 7:55 AM  Anesthesiologist:  Tonita Seip, MD  Performed by:   Anesthesiologist  Patient Location:  OB  Preanesthetic Checklist: patient identified, IV checked, risks and benefits discussed, monitors and equipment checked, pre-op evaluation, timeout performed, anesthesia consent and sterile technique used      Procedure Details    Patient Position:  Sitting  Prep: ChloraPrep    Monitoring:  Cardiac monitor  Approach:  Midline  Location:  L3-4  Injection Technique:  Single-shot    Needle    Needle Type:  Pencil-tip  Needle Gauge:  22 G  Needle Length:  3.5 in    Assessment    Sensory Level:   Events: clear CSF, CSF aspirated, well tolerated and blood negative      Additional Comments

## 2023-08-12 LAB
BASOPHILS # BLD AUTO: 0.06 X10(3) UL (ref 0–0.2)
BASOPHILS NFR BLD AUTO: 0.6 %
DEPRECATED RDW RBC AUTO: 45 FL (ref 35.1–46.3)
EOSINOPHIL # BLD AUTO: 0.1 X10(3) UL (ref 0–0.7)
EOSINOPHIL NFR BLD AUTO: 0.9 %
ERYTHROCYTE [DISTWIDTH] IN BLOOD BY AUTOMATED COUNT: 14.3 % (ref 11–15)
HCT VFR BLD AUTO: 29.7 %
HGB BLD-MCNC: 10.4 G/DL
IMM GRANULOCYTES # BLD AUTO: 0.07 X10(3) UL (ref 0–1)
IMM GRANULOCYTES NFR BLD: 0.7 %
LYMPHOCYTES # BLD AUTO: 1.47 X10(3) UL (ref 1–4)
LYMPHOCYTES NFR BLD AUTO: 13.9 %
MCH RBC QN AUTO: 30.4 PG (ref 26–34)
MCHC RBC AUTO-ENTMCNC: 35 G/DL (ref 31–37)
MCV RBC AUTO: 86.8 FL
MONOCYTES # BLD AUTO: 1.07 X10(3) UL (ref 0.1–1)
MONOCYTES NFR BLD AUTO: 10.1 %
NEUTROPHILS # BLD AUTO: 7.79 X10 (3) UL (ref 1.5–7.7)
NEUTROPHILS # BLD AUTO: 7.79 X10(3) UL (ref 1.5–7.7)
NEUTROPHILS NFR BLD AUTO: 73.8 %
PLATELET # BLD AUTO: 193 10(3)UL (ref 150–450)
RBC # BLD AUTO: 3.42 X10(6)UL
WBC # BLD AUTO: 10.6 X10(3) UL (ref 4–11)

## 2023-08-12 PROCEDURE — 85025 COMPLETE CBC W/AUTO DIFF WBC: CPT | Performed by: OBSTETRICS & GYNECOLOGY

## 2023-08-12 NOTE — LACTATION NOTE
LACTATION NOTE - MOTHER      Evaluation Type: Inpatient    Problems identified  Problems identified: Knowledge deficit  Problems Identified Other: 37.5w infant, rising stu level    Maternal history  Maternal history: AMA; Caesarean section;Gestational diabetes;Obesity; Hypothyroid    Breastfeeding goal  Breastfeeding goal: To maintain breast milk feeding per patient goal    Maternal Assessment  Bilateral Breasts: Soft;Symmetrical  Bilateral Nipples: Colostrum difficult to express; Everted  Prior breastfeeding experience (comment below): Primip  Breastfeeding Assistance: Breastfeeding assistance provided with permission    Pain assessment  Pain, additional: Pinching  Treatment of Sore Nipples: Coconut oil; Lanolin;Expressed breast milk;Deeper latch techniques    Guidelines for use of:  Equipment: Lanolin  Breast pump type: MOOI  Current use of pump[de-identified] will initiate pumping today with next feed, pumping for 37.5w infant-sleepy  Suggested use of pump: Pump each time a supplement is offered;Pump if infant is not latching to breast  Other (comment): Couplet seen at 25 hours. Reviewed INJOY booklet with extensive education. Infant recently got circumcision done and attempted to breastfeed but not showing feeding cues at this time and too sleepy to feed. Will attempt later. Discussed pumping to protect supply due to infants gestational age and sleepy feeding. Mom and infant instructed to do skin to skin now and will assist with a feed later.

## 2023-08-12 NOTE — ANESTHESIA POST-OP FOLLOW-UP NOTE
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    Current hospital day: Hospital Day: 2    Pain Scores: 2    Current Medications:  Scheduled Meds:   levothyroxine  50 mcg Oral Before breakfast    acetaminophen  1,000 mg Oral Q6H    ketorolac  30 mg Intravenous Q6H    ibuprofen  600 mg Oral Q6H    docusate sodium  100 mg Oral [email or web address]    prenatal vitamin with DHA  1 capsule Oral Daily     Continuous Infusions:   lactated ringers      dextrose in lactated ringers Stopped (23 1900)     PRN Meds:.gabapentin, witch hazel-glycerin, phenylephrine-min oil-jorge l, simethicone, magnesium hydroxide, bisacodyl, ondansetron, ammonia aromatic, HYDROmorphone, oxyCODONE    PE: AAOX3. Moving extremities. Assessment:  Pain controlled with pain meds.     Plan:  CPM    Total visit time 9 minutes    Mahesh Price MD  Anesthesia Acute Pain Service [phone]

## 2023-08-12 NOTE — DISCHARGE SUMMARY
[facility] [place] [facility]    [place]charge Summary    hPill Mayen, PhD Patient Status:  Inpatient    1985 MRN [id number]   Location [facility] 3SE Attending Vania Grubbs MD   [address] Day # 1 PCP None Pcp     Date of Admission: 2023    Date of Discharge: 8/15/2023       Admission Diagnoses: FARIDA ////AMA//Hypothyroid//multifibroid uterus//GDMA 2//LGA  Pregnancy     Hospital Course:     [facility]: Estimated Date of Delivery: 23    Gestational Age: 37w6d    Date of Delivery: 2023       Antepartum complications: gestational diabetes and macrosomia    Delivered By: Mary Argueta MD;     Delivery Type: LTCS         Baby: Liveborn male,           Anesthesia: spinal      Surgical Procedures       Case IDs Date Procedure Surgeon Location Status    [id number] 23  SECTION Vania Grubbs  [facility] L+D OR [place]               Pending Labs       Order Current Status    Specimen to Pathology Tissue In process            Discharge Plan:   Discharge Condition: Good  Early Discharge:  NO    Discharge medications:  Current Discharge Medication List    Home Meds - Unchanged    insulin aspart 100 Units/mL Subcutaneous Solution Pen-injector  Inject 14 Units into the skin 3 (three) times daily before meals. 14u breakfastk 16u lunch 20u dinner    insulin detemir 100 UNIT/ML Subcutaneous Solution Pen-injector  Inject 40 Units into the skin nightly. aspirin 81 MG Oral Tab EC  Take 1 tablet (81 mg total) by mouth daily. Prenatal MV-Min-Fe Fum-FA-DHA (PRENATAL MULTI +DHA) 27-0.8-200 MG Oral Cap      levothyroxine 50 MCG Oral Tab  Take 1 tablet (50 mcg total) by mouth before breakfast.    Ferrous Sulfate Dried (HIGH POTENCY IRON) 65 MG Oral Tab  1 tablet daily. Multiple Vitamin (MULTIVITAMIN ADULT OR)  Take by mouth. Nutritional Supplements (VITAMIN D BOOSTER OR)  Take by mouth.                 Discharge Diet: As tolerated and General diet    Discharge Activity: As tolerated    Follow up: Follow-up Information       Richi France MD Follow up in 6 week(s).     Specialty: OBSTETRICS & GYNECOLOGY  Contact information:  [address]                                        Janae Wren MD  8/12/2023

## 2023-08-12 NOTE — LACTATION NOTE
This note was copied from a baby's chart. 08/12/23 9182   Evaluation Type   Evaluation Type Inpatient   Problems & Assessment   Problems Diagnosed or Identified Sleepy;37-38 weeks gestation   Infant Assessment Hunger cues present   Muscle tone Appropriate for GA   Feeding Assessment   Summary Current Feeding Breastfeeding exclusively   Last 24 hour feeding summary see I&O flowsheet   Breastfeeding Assessment Assisted with breastfeeding w/mother's permission;Calm and ready to breastfeed;Deep latch achieved and observed; Coordinated suck/swallow   Breastfeeding lasted # of minutes 20   Breastfeeding Positions football;left breast;right breast   Latch 2   Audible Sucks/Swallows 1   Type of Nipple 2   Comfort (Breast/Nipple) 2   Hold (Positioning) 0   LATCH Score 7   Other (comment) Assisted with deep latches on both breasts. + nutritive sucks with some  swallows with breast massage.

## 2023-08-13 NOTE — LACTATION NOTE
LACTATION NOTE - MOTHER      Evaluation Type: Inpatient    Problems identified  Problems identified: Knowledge deficit; Unable to acheive sustained latch  Problems Identified Other: 37.5w infant, stu 12.7 TCB, weight loss @ 8.23%, sleepy infant    Maternal history  Maternal history: AMA; Caesarean section;Gestational diabetes;Obesity; Hypothyroid    Breastfeeding goal  Breastfeeding goal: To maintain breast milk feeding per patient goal    Maternal Assessment  Bilateral Breasts: Symmetrical;Soft  Bilateral Nipples: Colostrum difficult to express; Everted  Prior breastfeeding experience (comment below): Primip  Breastfeeding Assistance: Breastfeeding assistance provided with permission    Pain assessment  Pain, additional: Pain w/initial sucks only  Location/Comment: shallow latch  Treatment of Sore Nipples: Deeper latch techniques; Expressed breast milk; Lanolin;Coconut oil    Guidelines for use of:  Equipment: Lanolin  Breast pump type: Yolande; Ameda Platinum  Current use of pump[de-identified] pumped 1x overnight, educated to pump 6-8+ after breastfeeding attempts  Suggested use of pump: Pump each time a supplement is offered;Pump if infant is not latching to breast  Other (comment): Infant had an increase in weight loss overnight and rise in billirubin level and infant is very sleepy at the breast. Discussed with mom risk factors associated with decreased brestmilk supply in combination with infants weight loss, billirubin level, and GA and discussed need for supplementation. Mom was very tearful but ultimately decided to supplement. Plan is to attempt to breastfeed, then pump breasts and then offer supplementation. Mom has an yolande pump at home and may be ordering a spectra, plan to discuss hospital rental pump before discharge. Assisted mom to feed infant in football hold on right & left side, infant had few sucks and would not wake to stimulation. Discussed limiting the time at the breast to 10 minutes per side.  Mom pumped after and was able to collect 3mL of colostrum and dad paced bottle fed infant 10mL of formula. Handout for ABM suplementation guidelines given and plan is to make an outpatient appt with patient before discharge home.

## 2023-08-13 NOTE — LACTATION NOTE
This note was copied from a baby's chart. LACTATION NOTE - INFANT    Evaluation Type  Evaluation Type: Inpatient    Problems & Assessment  Problems Diagnosed or Identified: EOOELD;43-34 weeks gestation; Shallow latch;Latch difficulty  Problems: comment/detail: 37.5w infant, sleepy feeder, weight loss 8+%, stu elevated to 12.7  Infant Assessment: Minimal hunger cues present;Skin color: jaundice  Muscle tone: Appropriate for GA    Feeding Assessment  Summary Current Feeding: Breastfeeding with formula supplement  Last 24 hour feeding summary: see I&O flowsheet  Breastfeeding Assessment: Assisted with breastfeeding w/mother's permission; No sustained latch to breast  Latch: Too sleepy or reluctant, no latch achieved  Audible Sucks/Swallows: None  Type of Nipple: Everted (after stimulation)  Comfort (Breast/Nipple): Filling, red/small blisters/bruises, mild/mod discomfort  Hold (Positioning): Full assist, teach one side, mother does other, staff holds  [facility] Score: 4  Other (comment): Infant had an increase in weight loss overnight and rise in billirubin level and infant is very sleepy at the breast. Discussed with mom risk factors associated with decreased brestmilk supply in combination with infants weight loss, billirubin level, and GA and discussed need for supplementation. Mom was very tearful but ultimately decided to supplement. Plan is to attempt to breastfeed, then pump breasts and then offer supplementation. Mom has an yolande pump at home and may be ordering a spectra, plan to discuss hospital rental pump before discharge. Assisted mom to feed infant in football hold on right & left side, infant had few sucks and would not wake to stimulation. Discussed limiting the time at the breast to 10 minutes per side. Mom pumped after and was able to collect 3mL of colostrum and dad paced bottle fed infant 10mL of formula.  Handout for ABM suplementation guidelines given and plan is to make an outpatient appt with patient before discharge home. Output  # Voids in 24 hours: see I & O  # Stools in 24 hours: see I & O    Pre/Post Weights  Supplement Type: EBM/Formula    Equipment used  Equipment used:  Bottle with slow flow nipple

## 2023-08-14 NOTE — LACTATION NOTE
08/14/23 1215   Evaluation Type   Evaluation Type Inpatient   Problems identified   Problems identified Knowledge deficit; Unable to acheive sustained latch; Nipple trauma; Nipple pain   Maternal history   Maternal history AMA; Gestational diabetes;Obesity; Hypothyroid   Breastfeeding goal   Breastfeeding goal To maintain breast milk feeding per patient goal   Maternal Assessment   Bilateral Breasts Symmetrical;Soft   Bilateral Nipples Sore;Everted;Pink;Large   Prior breastfeeding experience (comment below) Primip   Breastfeeding Assistance Breastfeeding assistance provided with permission   Pain assessment   Pain, additional Pain w/pumping;Pain location   Pain Location Nipples   Location/Comment shallow latch   Treatment of Sore Nipples Deeper latch techniques; Expressed breast milk; Hydrogel dressings as directed; Lanolin   Guidelines for use of:   Equipment Lanolin;Hydrogel dressings   Breast pump type Ameda Platinum;Hand Pump   Current use of pump: pumping q2-3hr   Suggested use of pump Pump each time a supplement is offered;Pump if infant is not latching to breast

## 2023-08-14 NOTE — LACTATION NOTE
This note was copied from a baby's chart. 08/14/23 2755   Evaluation Type   Evaluation Type Inpatient   Problems & Assessment   Problems Diagnosed or Identified 37-38 weeks gestation; Latch difficulty; Shallow latch   Infant Assessment Minimal hunger cues present;Skin color: jaundice   Muscle tone Appropriate for GA   Feeding Assessment   Summary Current Feeding Breastfeeding with breast milk supplement;Breastfeeding with formula supplement; Infant not latching to breast;Pumping and feeding by bottle   Breastfeeding Assessment Assisted with breastfeeding w/mother's permission; Fussy infant, unable to sustain suckling to breast;Sleepy infant, quickly pacifies; No sustained latch to breast   Breastfeeding Positions football;left breast   Latch 1   Audible Sucks/Swallows 0   Type of Nipple 2   Comfort (Breast/Nipple) 1   Hold (Positioning) 1   LATCH Score 5   Pre/Post Weights   Supplement Type EBM/Formula

## 2023-08-15 VITALS
TEMPERATURE: 99 F | HEIGHT: 64 IN | BODY MASS INDEX: 40.29 KG/M2 | OXYGEN SATURATION: 99 % | DIASTOLIC BLOOD PRESSURE: 86 MMHG | HEART RATE: 90 BPM | RESPIRATION RATE: 16 BRPM | SYSTOLIC BLOOD PRESSURE: 134 MMHG | WEIGHT: 236 LBS

## 2023-08-15 RX ORDER — GABAPENTIN 300 MG/1
300 CAPSULE ORAL EVERY 8 HOURS PRN
Qty: 21 CAPSULE | Refills: 0 | Status: SHIPPED | OUTPATIENT
Start: 2023-08-15

## 2023-08-15 RX ORDER — IBUPROFEN 600 MG/1
600 TABLET ORAL EVERY 6 HOURS
Qty: 30 TABLET | Refills: 1 | Status: SHIPPED | OUTPATIENT
Start: 2023-08-15

## 2023-08-15 RX ORDER — ACETAMINOPHEN 500 MG
1000 TABLET ORAL EVERY 6 HOURS
Refills: 0 | Status: SHIPPED | COMMUNITY
Start: 2023-08-15

## 2023-08-15 NOTE — DISCHARGE SUMMARY
[facility]     Discharge Summary           Adina Mehta, PhD Patient Status:  Inpatient    1985 MRN [id number]   Location [address] Attending Talbot Romberg, MD   [facility] Day # 1 PCP None Pcp      Date of Admission: 2023     Date of Discharge:         Admission Diagnoses: FARIDA ////AMA//Hypothyroid//multifibroid uterus//GDMA 2//LGA  Pregnancy     Hospital Course:      [facility]: Estimated Date of Delivery: 23     Gestational Age: 37w6d     Date of Delivery: 2023        Antepartum complications: gestational diabetes and macrosomia     Delivered By: Umu Quniteros MD;      Delivery Type: LTCS           Baby: Liveborn male,                       Anesthesia: spinal        Surgical Procedures         Case IDs Date Procedure Surgeon Location Status     [id number] 23  SECTION Talbot Romberg,  [facility] L+D OR [place]                   Pending Labs         Order Current Status     Specimen to Pathology Tissue In process                Discharge Plan:   Discharge Condition: Good  Early Discharge:  NO     Discharge medications:  Current Discharge Medication List     Home Meds - Unchanged     insulin aspart 100 Units/mL Subcutaneous Solution Pen-injector  Inject 14 Units into the skin 3 (three) times daily before meals. 14u breakfastk 16u lunch 20u dinner     insulin detemir 100 UNIT/ML Subcutaneous Solution Pen-injector  Inject 40 Units into the skin nightly. aspirin 81 MG Oral Tab EC  Take 1 tablet (81 mg total) by mouth daily. Prenatal MV-Min-Fe Fum-FA-DHA (PRENATAL MULTI +DHA) 27-0.8-200 MG Oral Cap        levothyroxine 50 MCG Oral Tab  Take 1 tablet (50 mcg total) by mouth before breakfast.     Ferrous Sulfate Dried (HIGH POTENCY IRON) 65 MG Oral Tab  1 tablet daily. Multiple Vitamin (MULTIVITAMIN ADULT OR)  Take by mouth. Nutritional Supplements (VITAMIN D BOOSTER OR)  Take by mouth. Discharge Diet: As tolerated and General diet     Discharge Activity: As tolerated     Follow up: Follow-up Information         Adelita Bennett MD Follow up in 6 week(s).     Specialty: OBSTETRICS & GYNECOLOGY  Contact information:  [facility]  [phone]

## 2023-08-15 NOTE — LACTATION NOTE
08/15/23 0915   Evaluation Type   Evaluation Type Inpatient   Problems identified   Problems identified Knowledge deficit;Milk supply WNL; Nipple pain   Maternal history   Maternal history AMA; Gestational diabetes;Obesity; Hypothyroid   Breastfeeding goal   Breastfeeding goal To maintain breast milk feeding per patient goal   Maternal Assessment   Prior breastfeeding experience (comment below) Primip   Pain assessment   Pain, additional Pain location   Pain Location Nipples   Pain scale comment Pt states that nipple pain is much less   Treatment of Sore Nipples Hydrogel dressings as directed; Lanolin;Coconut oil;Expressed breast milk   Guidelines for use of:   Equipment Hydrogel dressings   Breast pump type Kate; Ameda Platinum;Hand Pump  (Pt did the monthly hospital pump rental)   Current use of pump: pumping q2-3hr   Suggested use of pump Pump each time a supplement is offered;Pump after nursing if a nipple shield is used;Pump if infant is not latching to breast

## 2023-08-18 ENCOUNTER — NURSE ONLY (OUTPATIENT)
Dept: LACTATION | Facility: HOSPITAL | Age: 38
End: 2023-08-18
Attending: OBSTETRICS & GYNECOLOGY
Payer: COMMERCIAL

## 2023-08-18 PROCEDURE — 99213 OFFICE O/P EST LOW 20 MIN: CPT

## 2023-08-18 NOTE — PROGRESS NOTES
LACTATION NOTE - MOTHER      Evaluation Type: Outpatient Initial    Problems identified  Problems identified: Knowledge deficit;Milk supply WNL; Unable to acheive sustained latch; Nipple pain    Maternal history  Maternal history: AMA; Caesarean section;Obesity; Hypothyroid; Gestational diabetes    Breastfeeding goal  Breastfeeding goal: To maintain breast milk feeding per patient goal    Maternal Assessment  Bilateral Breasts: Soft;Elastic  Bilateral Nipples: Large;Pink;Sore  Prior breastfeeding experience (comment below): Primip  Breastfeeding Assistance: Breastfeeding assistance provided with permission    Pain assessment  Pain, additional: Pain location  Pain Location: Nipples  Location/Comment: Right more painful than left  Treatment of Sore Nipples: Deeper latch techniques; Hydrogel dressings as directed    Guidelines for use of:  Equipment: Nipple shield  Breast pump type: Ameda Platinum;Kate  Current use of pump[de-identified] 8-10 times per day  Suggested use of pump: Pump 8-12X/24hr;Pump after nursing if a nipple shield is used;Pump if infant is not latching to breast;Pump each time a supplement is offered  Reported pumping volumes (ml): 2-3 oz  Post-feed pumped volume: Not assessed  Other (comment): Mom reports discomfort with pumping using hospital-grade Ameda pump; currently usin 28.5 mm flanges. Reports nipple gets \"stuck\" inside flange tunnel. Provided 30.5 mm flanges. Situation:    C/O: Latch difficulty, pumping and bottle feeding    Background:    HX: LGA baby with 8% weight loss and jaundice with phototherapy prior to discharge. Began supplementing with formula in hospital and unable to establish breastfeeding, baby refusing breast. Mom is pumping and bottle-feeding, offers 1.5-2 oz pumped BM and expresses 2-3 oz using hospital grade Ameda breast pump. BW: 4350 g / 9#9. 4 lbs  Dc wt: 8#12.4 lbs    Today's wt: 3926 g / 8#10.5 lbs    Void/Stool: Adequate    Maternal considerations: Large nipple size    Assessment:    Breastfeeding: Attempt in crade/cross-cradle, fussy infant disorganized and reluctant to latch. Repositioned in football hold, able to sustain latch using nipple shield with encouragement and use of breast compressions. Full feeding observed with infrequent, audible swallows observed. Brief lip smacking, able to correct by parting lips into flanged position. Total transfer of milk 5 ml    Bottle: 1.5 oz pumped breastmilk taken in Dr. Alexandr Zamora bottle. Instructed on paced feedings. Maternal Pumping: Deferred    Recommendations:    Offer breast 5-6 times during the day, use nipple shield as needed. Perform breast compressions intermittently when infant showing signs of fatigue. Keep on breast 15-30 minutes, then follow with bottle supplement.  If infant fussy and refusing breast, stop

## 2023-08-24 ENCOUNTER — NURSE ONLY (OUTPATIENT)
Dept: LACTATION | Facility: HOSPITAL | Age: 38
End: 2023-08-24
Attending: OBSTETRICS & GYNECOLOGY
Payer: COMMERCIAL

## 2023-08-24 PROCEDURE — 99213 OFFICE O/P EST LOW 20 MIN: CPT

## 2023-08-24 NOTE — PROGRESS NOTES
LACTATION NOTE - MOTHER           Problems identified  Problems identified: Milk supply WNL; Knowledge deficit; Unable to acheive sustained latch  Problems Identified Other: Nipple shield use         Breastfeeding goal  Breastfeeding goal: To maintain breast milk feeding per patient goal    Maternal Assessment  Bilateral Breasts: Soft;Symmetrical  Bilateral Nipples: Large; WNL  Prior breastfeeding experience (comment below): Primip  Breastfeeding Assistance: Breastfeeding assistance provided with permission    Pain assessment  Treatment of Sore Nipples: Coconut oil;Deeper latch techniques    Guidelines for use of:  Equipment: Nipple shield  Breast pump type: Ameda Platinum  Current use of pump[de-identified] 6x per day  Suggested use of pump: Pump 8-12X/24hr;Pump after nursing if a nipple shield is used;Pump if infant is not latching to breast;Pump each time a supplement is offered  Reported pumping volumes (ml): 2-3 oz  Post-feed pumped volume: 4.25  Other (comment): Flange assessment done, recommended using 30.5 mm flanges                 Situation:     C/O: Nursing with nipple shield plus bottle supplement, follow up weight check     Background:     HX: Breastfeeding with nipple shield 6 times per day, leaves on breast around 30+ minutes. Offers 2.5 oz pumped BM via bottle for all feedings (8-10/day), sometimes Araceli List will take a nap ranging from 15-60 minutes after nursing before taking bottle. Mom pumps 6 times per day and expresses 2-3 oz using hospital grade Ameda breast pump. BW: 4350 g / 9#9. 4 lbs  Dc wt: 8#12.4 lbs  Last wt: 3926 g / 8#10.5 lbs on 8/18/23    Today's wt: 4094 g / 9#0.4 oz   (gain of 168 grams since last visit 6 days ago, below birthweight at 15 days old)     Void/Stool: Adequate     Maternal considerations: Large nipple size, use of size 24 mm nipple shield     Assessment:     Breastfeeding: Brief attempt in football hold without nipple shield, infant disorganized and unable to latch.  Relatched using nipple shield, infant now coordinated with frequent sucking bursts but few audible swallows. Responded well to breast compressions, quickly fatigues at breast. 15 minute breastfeeding session, only right breast offered. Upper and lower lip blisters observed. Total transfer of milk 2 ml     Bottle: 2 oz Enfamil taken in Dr. Bruce Chauhan bottle. Upper lip frequently drawn inward, able to slightly correct by rotating nipple. Area of redness visible above upper lip during feeding. Maternal Pumpin.25 oz expressed using Ameda breast pump     Recommendations:     Offer breast 5-6 times during the day, use nipple shield as needed. Perform breast compressions intermittently. Keep on breast up to 15-20 minutes, stop when infant showing signs of fatigue (do not exceed 15-20 minutes). Follow with 2-3 oz bottle supplement. Feed minimum 8 times per day, offer 2-3 oz pumped BM/formula for all feedings and increase as tolerated. Perform paced bottle-feedings in upright position. Rotate Dr. Bruce Chauhan nipple inside Jerrod's mouth as needed to allow upper lip to flange out. Pump 6-8 times per day for 15 minutes, use 30.5 mm flanges. Return to see Lactation on 23 at 2 pm for weight check. Follow up with pediatrician tomorrow as scheduled, discuss oral assessment and possible referrals based on today's observations (Use of accessory muscles, lip blisters on upper and lower lip, upper lip frequently tucks inward, stress marks visible on upper lip, white coating on tongue).

## 2023-08-31 ENCOUNTER — NURSE ONLY (OUTPATIENT)
Dept: LACTATION | Facility: HOSPITAL | Age: 38
End: 2023-08-31
Attending: OBSTETRICS & GYNECOLOGY
Payer: COMMERCIAL

## 2023-08-31 PROCEDURE — 99213 OFFICE O/P EST LOW 20 MIN: CPT

## 2023-09-15 ENCOUNTER — TELEPHONE (OUTPATIENT)
Dept: OBGYN UNIT | Facility: HOSPITAL | Age: 38
End: 2023-09-15

## 2024-03-21 ENCOUNTER — LAB ENCOUNTER (OUTPATIENT)
Dept: LAB | Age: 39
End: 2024-03-21
Attending: PHYSICIAN ASSISTANT
Payer: COMMERCIAL

## 2024-03-21 ENCOUNTER — OFFICE VISIT (OUTPATIENT)
Dept: FAMILY MEDICINE CLINIC | Facility: CLINIC | Age: 39
End: 2024-03-21
Payer: COMMERCIAL

## 2024-03-21 VITALS
HEART RATE: 77 BPM | BODY MASS INDEX: 35.34 KG/M2 | SYSTOLIC BLOOD PRESSURE: 119 MMHG | WEIGHT: 207 LBS | HEIGHT: 64 IN | DIASTOLIC BLOOD PRESSURE: 80 MMHG

## 2024-03-21 DIAGNOSIS — Z00.00 ROUTINE GENERAL MEDICAL EXAMINATION AT A HEALTH CARE FACILITY: ICD-10-CM

## 2024-03-21 DIAGNOSIS — R73.03 PREDIABETES: ICD-10-CM

## 2024-03-21 DIAGNOSIS — Z00.00 ROUTINE GENERAL MEDICAL EXAMINATION AT A HEALTH CARE FACILITY: Primary | ICD-10-CM

## 2024-03-21 LAB
BASOPHILS # BLD AUTO: 0.1 X10(3) UL (ref 0–0.2)
BASOPHILS NFR BLD AUTO: 1.4 %
CHOLEST SERPL-MCNC: 150 MG/DL (ref ?–200)
DEPRECATED RDW RBC AUTO: 44.2 FL (ref 35.1–46.3)
EOSINOPHIL # BLD AUTO: 0.13 X10(3) UL (ref 0–0.7)
EOSINOPHIL NFR BLD AUTO: 1.8 %
ERYTHROCYTE [DISTWIDTH] IN BLOOD BY AUTOMATED COUNT: 14.3 % (ref 11–15)
EST. AVERAGE GLUCOSE BLD GHB EST-MCNC: 114 MG/DL (ref 68–126)
FASTING PATIENT LIPID ANSWER: NO
HBA1C MFR BLD: 5.6 % (ref ?–5.7)
HCT VFR BLD AUTO: 38.3 %
HDLC SERPL-MCNC: 46 MG/DL (ref 40–59)
HGB BLD-MCNC: 12.7 G/DL
IMM GRANULOCYTES # BLD AUTO: 0.01 X10(3) UL (ref 0–1)
IMM GRANULOCYTES NFR BLD: 0.1 %
LDLC SERPL CALC-MCNC: 74 MG/DL (ref ?–100)
LYMPHOCYTES # BLD AUTO: 2.46 X10(3) UL (ref 1–4)
LYMPHOCYTES NFR BLD AUTO: 33.9 %
MCH RBC QN AUTO: 28.4 PG (ref 26–34)
MCHC RBC AUTO-ENTMCNC: 33.2 G/DL (ref 31–37)
MCV RBC AUTO: 85.7 FL
MONOCYTES # BLD AUTO: 0.55 X10(3) UL (ref 0.1–1)
MONOCYTES NFR BLD AUTO: 7.6 %
NEUTROPHILS # BLD AUTO: 4.01 X10 (3) UL (ref 1.5–7.7)
NEUTROPHILS # BLD AUTO: 4.01 X10(3) UL (ref 1.5–7.7)
NEUTROPHILS NFR BLD AUTO: 55.2 %
NONHDLC SERPL-MCNC: 104 MG/DL (ref ?–130)
PLATELET # BLD AUTO: 294 10(3)UL (ref 150–450)
RBC # BLD AUTO: 4.47 X10(6)UL
TRIGL SERPL-MCNC: 180 MG/DL (ref 30–149)
VIT B12 SERPL-MCNC: 1030 PG/ML (ref 211–911)
VLDLC SERPL CALC-MCNC: 28 MG/DL (ref 0–30)
WBC # BLD AUTO: 7.3 X10(3) UL (ref 4–11)

## 2024-03-21 PROCEDURE — 83036 HEMOGLOBIN GLYCOSYLATED A1C: CPT

## 2024-03-21 PROCEDURE — 85025 COMPLETE CBC W/AUTO DIFF WBC: CPT

## 2024-03-21 PROCEDURE — 3079F DIAST BP 80-89 MM HG: CPT | Performed by: PHYSICIAN ASSISTANT

## 2024-03-21 PROCEDURE — 82607 VITAMIN B-12: CPT

## 2024-03-21 PROCEDURE — 36415 COLL VENOUS BLD VENIPUNCTURE: CPT

## 2024-03-21 PROCEDURE — 3008F BODY MASS INDEX DOCD: CPT | Performed by: PHYSICIAN ASSISTANT

## 2024-03-21 PROCEDURE — 3074F SYST BP LT 130 MM HG: CPT | Performed by: PHYSICIAN ASSISTANT

## 2024-03-21 PROCEDURE — 99385 PREV VISIT NEW AGE 18-39: CPT | Performed by: PHYSICIAN ASSISTANT

## 2024-03-21 PROCEDURE — 80061 LIPID PANEL: CPT

## 2024-03-21 RX ORDER — METFORMIN HYDROCHLORIDE 750 MG/1
750 TABLET, EXTENDED RELEASE ORAL 2 TIMES DAILY WITH MEALS
COMMUNITY
Start: 2024-02-16

## 2024-03-21 NOTE — PROGRESS NOTES
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   The patient states that the patient has no energy for couple months. Patient works from home.  Patient denies of chest pain, SOB, N/V/C/D, fever, dizziness, syncope, abdominal pain. There are no other concerns today.      Allergies:   No Known Allergies    CURRENT MEDICATIONS   Current Outpatient Medications   Medication Sig Dispense Refill    cholecalciferol (D3-1000) 25 MCG (1000 UT) Oral Cap       metFORMIN  MG Oral Tablet 24 Hr Take 1 tablet (750 mg total) by mouth 2 (two) times daily with meals.      Prenatal MV-Min-Fe Fum-FA-DHA (PRENATAL MULTI +DHA) 27-0.8-200 MG Oral Cap       ibuprofen 600 MG Oral Tab Take 1 tablet (600 mg total) by mouth every 6 (six) hours. 30 tablet 1    levothyroxine 50 MCG Oral Tab Take 1 tablet (50 mcg total) by mouth before breakfast.      Ferrous Sulfate Dried (HIGH POTENCY IRON) 65 MG Oral Tab 1 tablet daily.        HISTORICAL INFORMATION   Past Medical History:   Diagnosis Date    ACNE     treating with OCP    Anemia     Chronic constipation     Gluten intolerance     Hypothyroid     Hypothyroidism     Insulin resistance 04/05/2022    Prediabetes 04/05/2022    Snoring       Past Surgical History:   Procedure Laterality Date    OTHER SURGICAL HISTORY  2002    surgery for shoulder dislocation    ROTATOR CUFF REPAIR      Right Shoulder     WISDOM TEETH REMOVED        Family History   Problem Relation Age of Onset    Diabetes Mother     High Blood Pressure Mother     Other (lukemia) Father     Asthma Brother     Colon Cancer Maternal Grandmother     Breast Cancer Maternal Grandmother     High Blood Pressure Maternal Grandmother     Other (congestive heart failure) Paternal Grandfather     Asthma Father     Cancer Father         Leukemia    Other (Other[other]) Father         brain anyerism    Thyroid Disorder Mother     Hypertension Mother     Thyroid Disorder Sister       SOCIAL  HISTORY   Social History     Socioeconomic History    Marital status:    Tobacco Use    Smoking status: Light Smoker    Smokeless tobacco: Never   Vaping Use    Vaping Use: Never used   Substance and Sexual Activity    Alcohol use: Yes     Comment: 1 Drink every other week     Drug use: Never    Sexual activity: Yes     Partners: Male     Birth control/protection: OCP     Comment: never sexually active as of 6/28/13   Social History Narrative    ** Merged History Encounter **          Social Determinants of Health     Financial Resource Strain: Low Risk  (8/11/2023)    Financial Resource Strain     Difficulty of Paying Living Expenses: Not hard at all     Med Affordability: No   Food Insecurity: No Food Insecurity (8/11/2023)    Food Insecurity     Food Insecurity: Never true   Transportation Needs: No Transportation Needs (8/11/2023)    Transportation Needs     Lack of Transportation: No   Stress: No Stress Concern Present (8/11/2023)    Stress     Feeling of Stress : No   Housing Stability: Low Risk  (8/11/2023)    Housing Stability     Housing Instability: No     Social History     Social History Narrative    ** Merged History Encounter **             REVIEW OF SYSTEMS:     Review of Systems   Constitutional:  Positive for fatigue.   HENT: Negative.     Eyes: Negative.    Respiratory: Negative.     Cardiovascular: Negative.    Gastrointestinal: Negative.    Genitourinary: Negative.    Musculoskeletal: Negative.    Skin: Negative.    Neurological: Negative.    Psychiatric/Behavioral: Negative.           EXAM:   /80   Pulse 77   Ht 5' 4\" (1.626 m)   Wt 207 lb (93.9 kg)   LMP 02/29/2024 (Approximate)   Breastfeeding Yes   BMI 35.53 kg/m²    Wt Readings from Last 6 Encounters:   03/21/24 207 lb (93.9 kg)   08/11/23 236 lb (107 kg)   04/08/22 207 lb (93.9 kg)   01/05/22 200 lb 9.6 oz (91 kg)   11/08/21 203 lb 3.2 oz (92.2 kg)   06/28/13 150 lb (68 kg)     Body mass index is 35.53 kg/m².    Physical  Exam  Vitals reviewed.   Constitutional:       Appearance: She is well-developed.   HENT:      Head: Normocephalic and atraumatic.      Right Ear: Tympanic membrane, ear canal and external ear normal. There is no impacted cerumen.      Left Ear: Tympanic membrane, ear canal and external ear normal. There is no impacted cerumen.      Nose: Nose normal.      Mouth/Throat:      Mouth: Mucous membranes are moist.      Pharynx: Oropharynx is clear. No oropharyngeal exudate or posterior oropharyngeal erythema.   Eyes:      General:         Right eye: No discharge.         Left eye: No discharge.      Conjunctiva/sclera: Conjunctivae normal.   Cardiovascular:      Rate and Rhythm: Normal rate and regular rhythm.      Heart sounds: Normal heart sounds.   Pulmonary:      Effort: Pulmonary effort is normal.      Breath sounds: Normal breath sounds.   Chest:      Chest wall: No mass, lacerations, deformity, swelling or tenderness.   Breasts:     Breasts are symmetrical.      Right: Normal. No inverted nipple, mass, nipple discharge, skin change or tenderness.      Left: Normal. No inverted nipple, mass, nipple discharge, skin change or tenderness.   Abdominal:      General: Abdomen is flat. Bowel sounds are normal. There is no distension.      Palpations: Abdomen is soft.      Tenderness: There is no abdominal tenderness. There is no right CVA tenderness or left CVA tenderness.   Genitourinary:     Vagina: Normal.   Musculoskeletal:         General: Normal range of motion.      Cervical back: Normal range of motion and neck supple.   Lymphadenopathy:      Upper Body:      Right upper body: No supraclavicular or axillary adenopathy.      Left upper body: No supraclavicular or axillary adenopathy.   Skin:     Findings: No rash.   Neurological:      Mental Status: She is alert and oriented to person, place, and time.   Psychiatric:         Behavior: Behavior normal.         Thought Content: Thought content normal.          Judgment: Judgment normal.          ASSESSMENT AND PLAN:   Claudia was seen today for new patient and routine physical.    Diagnoses and all orders for this visit:    Routine general medical examination at a health care facility  -     CBC With Differential With Platelet; Future  -     Vitamin B12; Future  -     Hemoglobin A1C; Future  -     Lipid Panel; Future    Prediabetes  -     Hemoglobin A1C; Future    The patient had lab work up done last month. CMP and thyroid were normal.    There are no Patient Instructions on file for this visit.    The patient indicates understanding of these issues and agrees to the plan.    Problem List:  Patient Active Problem List   Diagnosis    Fertility testing    Enlarged thyroid    Obesity (BMI 35.0-39.9 without comorbidity)    Hypothyroid    Chronic constipation    Gluten intolerance    Snoring    Congenital hypothyroidism without goiter    Prediabetes    Insulin resistance    Pregnancy (HCC)    Difficulty of mother performing breastfeeding (HCC)       Heath Roche PA-C  3/21/2024  2:11 PM

## 2024-05-13 ENCOUNTER — TELEPHONE (OUTPATIENT)
Dept: LACTATION | Facility: HOSPITAL | Age: 39
End: 2024-05-13

## 2025-07-17 DIAGNOSIS — O28.3 ABNORMAL FETAL ULTRASOUND: Primary | ICD-10-CM

## 2025-07-17 DIAGNOSIS — E11.9 TYPE 2 DIABETES MELLITUS (HCC): ICD-10-CM

## 2025-07-28 ENCOUNTER — HOSPITAL ENCOUNTER (OUTPATIENT)
Dept: PERINATAL CARE | Facility: HOSPITAL | Age: 40
Discharge: HOME | End: 2025-07-28
Attending: PEDIATRICS

## 2025-07-28 ENCOUNTER — HOSPITAL ENCOUNTER (OUTPATIENT)
Dept: PERINATAL CARE | Facility: HOSPITAL | Age: 40
End: 2025-07-28
Attending: PEDIATRICS
Payer: COMMERCIAL

## 2025-07-28 VITALS
WEIGHT: 229 LBS | BODY MASS INDEX: 39 KG/M2 | SYSTOLIC BLOOD PRESSURE: 118 MMHG | DIASTOLIC BLOOD PRESSURE: 76 MMHG | HEART RATE: 103 BPM

## 2025-07-28 DIAGNOSIS — E66.9 OBESITY (BMI 35.0-39.9 WITHOUT COMORBIDITY): Primary | ICD-10-CM

## 2025-07-28 DIAGNOSIS — E66.9 OBESITY (BMI 35.0-39.9 WITHOUT COMORBIDITY): ICD-10-CM

## 2025-07-28 PROCEDURE — 76827 ECHO EXAM OF FETAL HEART: CPT

## 2025-07-28 PROCEDURE — 76825 ECHO EXAM OF FETAL HEART: CPT | Performed by: PEDIATRICS

## 2025-07-28 PROCEDURE — 93325 DOPPLER ECHO COLOR FLOW MAPG: CPT

## 2025-07-28 NOTE — ADDENDUM NOTE
Encounter addended by: Hafsa Jackson on: 7/28/2025 2:13 PM   Actions taken: Charge Capture section accepted

## 2025-07-28 NOTE — PROGRESS NOTES
CARDIOLOGY CONSULTATION AND FETAL ECHOCARDIOGRAM :    Dear Dr. Cullen,     Thank you for requesting fetal echocardiogram and  cardiology consultation on Claudia Benito.    As you are aware she is a 39 year old female with a Engel pregnancy at 34w1d.      A  cardiology consultation was requested secondary to DM2, as well as concern for cardiac hypertrophy and pericardial effusion.  2025 :  4-chamber view: the wall are thick with small rim of pericardial effusion (Dr. Manzanares)      Additional risk factors :  AMA  Obesity    Hypothyroidism    She was accompanied by her .     Her prenatal records and labs were reviewed.    Panorama test had low cell fraction.  Review of History:     OB History:    OB History     T1    L1    SAB0  IAB0  Ectopic0  Multiple0  Live Births1      Name of Baby 1: ANASTASIA BENITO    Date: 23         GA: 37w5d            Type: , Unspecified    Apgar1: 9                Apgar5: 9               Living: Living     Name of Baby 2: Not recorded    Date: Not recorded     GA: Not recorded     Type: Not recorded    Apgar1: Not recorded     Apgar5: Not recorded    Living: Not recorded              Allergies:  No Known Allergies  Current Meds:  Current Outpatient Medications  Medication Sig Dispense Refill   Insulin Glargine, 1 Unit Dial, (TOUJEO SOLOSTAR) 300 UNIT/ML Subcutaneous Solution Pen-injector Inject 30 Units as directed daily with dinner. 3 mL 1   Insulin Pen Needle 32G X 4 MM Does not apply Misc Use a new needle for each insulin injection. 90 each 1   Continuous Glucose Sensor (FREESTYLE RUY 3 PLUS SENSOR) Does not apply Misc 1 each every 14 (fourteen) days. Remove sensor after 14 days. 2 each 5   SYNTHROID 75 MCG Oral Tab Take 75 mcg by mouth before breakfast.       Glucose Blood (ACCU-CHEK GUIDE TEST) In Vitro Strip Check blood glucose 1-3 times daily or as directed 300 strip 3   Accu-Chek Softclix Lancets Does not apply  Misc Check blood glucose 4-5 times daily as directed 200 each 3   Continuous Glucose Sensor (FREESTYLE RUY 3 SENSOR) Does not apply Misc 1 each every 14 (fourteen) days. Remove sensor after 14 days. 2 each 5   SYNTHROID 50 MCG Oral Tab         cholecalciferol 25 MCG (1000 UT) Oral Cap Take 1,000 Units by mouth daily.       SYNTHROID 25 MCG Oral Tab Take 50 mcg by mouth before breakfast.       Prenatal MV-Min-Fe Fum-FA-DHA (PRENATAL MULTI +DHA) 27-0.8-200 MG Oral Cap         Ferrous Sulfate Dried (HIGH POTENCY IRON) 65 MG Oral Tab 1 tablet daily.       metFORMIN  MG Oral Tablet 24 Hr Take 1 tablet (750 mg total) by mouth 2 (two) times daily with meals. (Patient not taking: Reported on 5/20/2025) 180 tablet 3       HISTORY:  Past Medical History:  Diagnosis Date   ACNE      treating with OCP   Chronic constipation     Gluten intolerance     Hypothyroid     Insulin resistance 4/5/2022   Prediabetes 4/5/2022   Snoring      Past Surgical History:  Procedure Laterality Date   OTHER SURGICAL HISTORY   2002    surgery for shoulder dislocation   ROTATOR CUFF REPAIR        Right Shoulder    WISDOM TEETH REMOVED        Family History  Problem Relation Age of Onset   Diabetes Mother     High Blood Pressure Mother     Other (leukemia) Father     Asthma Brother     Colon Cancer Maternal Grandmother     Breast Cancer Maternal Grandmother     High Blood Pressure Maternal Grandmother     Other (congestive heart failure) Paternal Grandfather     Asthma Father     Cancer Father          Leukemia   Other (Other[other]) Father          brain aneurysm   Thyroid Disorder Mother     Hypertension Mother     Thyroid Disorder Sister      Social History    Tobacco Use      Smoking status: Light Smoker      Smokeless tobacco: Never    Vaping Use      Vaping status: Never Used    Alcohol use: Not Currently      Comment: 1 Drink every other week     Drug use: Never     FETAL ECHOCARDIOGRAM :  (Fetal heart measurements are under the  imaging tab)    Situs: situs solitus (normal). Cardiac position: levocardia (normal). Cardiac axis: normal. Cardiac size: normal (approx. 1/3 of thoracic area). Cardiac rhythm: regular (normal). Cardiac function: good contractility (normal). 4-chamber view: normal. LVOT view: normal. RVOT view: normal. 3-vessel view: normal. 3-vessel-trachea view: normal. Long axis view: normal. Aortic arch view: normal. Ductal arch view: normal. Bicaval view: normal    AV connections: Normal connections. VA connections: Normal connections. IVC: Normal entrance into the right atrium. SVC: Normal entrance into the right atrium. Pulmonary veins: Two pulmonary veins entry into the left atrium. Right atrium: Normal in size. Left atrium: Normal in size. Atrial septum: foramen ovale in the central third/half, flap valve in LA. Foramen ovale: normal flow: right to left. Right ventricle: Mild RV hypertrophy  with normal systolic functions. Left ventricle: Mild  LV hypertrophy with normal systolic functions. Ventricular septum: Visualized portions of the ventricular are intact  Tricuspid valve: normal size and morphology. Mitral valve: normal size and morphology. Pulmonary valve: normal size . Aortic valve: normal size. Cross-over gr. arteries: anterior great artery (confirmed to be the pulmonary artery by its branching) which crosses the course of the proximal aorta, indicative of normal relationship of the great arteries. Main PA: the main pulmonary artery can be seen bifurcating into the ductus arteriosus and the right pulmonary artery. Ascending aorta: normal size and morphology. Ductal arch: Left ductus arteriosus. Aortic arch: Single left sided aortic arch with no evidence for coarctation. Ductus venosus: normal. Umbilical vein: normal. Umbilical arteries: normal  Echogenic focus: no. Pericardial effusion: no  Color Doppler (Qualitatively):   IVC inflow into RA: normal. SVC inflow into RA: normal. Pulm. veins inflow into LA: normal.  Flow through foramen ovale: right-left shunt (normal). Tricuspid valve flow: normal. Mitral valve flow: normal. Ventricular septum: normal. RVOT / Pulmonary valve flow: normal. LVOT / Aortic valve flow: normal. Flow in pulmonary arteries: normal. Flow in ductus arteriosus: normal. Flow in aortic arch: normal. Flow in ductus venosus: normal. Flow in the umbilical vein: normal. Flow in the umbilical arteries: normal    Mild concentric biventricular hypertrophy. No significant pericardial effusion. Otherwise limited unremarkable fetal echocardiogram. No major structural or functional abnormality was noted.    Fetal echocardiogram findings are described above.     There was a mild concentric biventricular hypertrophy. No significant pericardial effusion. Otherwise limited unremarkable fetal echocardiogram. No major structural or functional abnormality was noted.     I had a lengthy discussion regarding my findings with the parents. I drew a picture of the fetal heart and went over my findings with them. We talked about the importance of glucose control to prevent hypertrophic cardiomyopathy of the infants of the diabetic mothers. They had many questions.  I answered them all. They seemed to have a good understanding the issues we talked.       IMPRESSION :    39 years old G2 L1 IUP at 34w 1d  DM2, suboptimally controlled  Obesity BMI 36  AMA- low risk NIPT  H/o GDM A2  Possible hypertrophy of fetal ventricular wall  Chronic constipation    Gluten intolerance    Hypothyroid    Insulin resistance  Surgery for shoulder dislocation  Smoking status: Light Smoker  Fetal echocardiogram : Mild concentric biventricular hypertrophy. No significant pericardial effusion. Otherwise limited unremarkable fetal echocardiogram. No major structural or functional abnormality was noted.    RECOMMENDATIONS :    Routine Ob and MFM care  Strict diabetes control to prevent hypertrophic cardiomyopathy of the infants of the diabetic  mothers   echocardiogram prior to discharge      Small VSDs,minor valve problems,coronary artery anomalies,PAPVR and coarctation of the aorta cannot be excluded by fetal echocardiograms.      Thank you for allowing me to participate in the care of this patient.  Please feel free to contact me with any questions.     Reinaldo Beck MD  Fetal/Pediatric Cardiology     Total time spent 60 minutes this calendar day which includes preparing to see the patient including chart review, obtaining and/or reviewing additional medical history, documenting clinical information in the electronic medical record, independently interpreting results, counseling the patient, communicating results to the patient/family/caregiver and coordinating care.     Note to patient and family:  The 21st Century Cures Act makes medical notes available to patients in the interest of transparency.  However, please be advised that this is a medical document.  It is intended as a peer to peer communication.  It is written in medical language and may contain abbreviations or verbiage that are technical and unfamiliar.  It may appear blunt or direct.  Medical documents are intended to carry relevant information, facts as evident, and the clinical opinion of the practitioner.

## 2025-08-17 ENCOUNTER — LAB ENCOUNTER (OUTPATIENT)
Dept: LAB | Facility: HOSPITAL | Age: 40
End: 2025-08-17
Attending: OBSTETRICS & GYNECOLOGY

## 2025-08-17 DIAGNOSIS — Z01.818 PRE-OP TESTING: Primary | ICD-10-CM

## 2025-08-17 LAB
ANTIBODY SCREEN: NEGATIVE
BASOPHILS # BLD AUTO: 0.05 X10(3) UL (ref 0–0.2)
BASOPHILS NFR BLD AUTO: 0.7 %
DEPRECATED RDW RBC AUTO: 41.9 FL (ref 35.1–46.3)
EOSINOPHIL # BLD AUTO: 0.04 X10(3) UL (ref 0–0.7)
EOSINOPHIL NFR BLD AUTO: 0.6 %
ERYTHROCYTE [DISTWIDTH] IN BLOOD BY AUTOMATED COUNT: 13.7 % (ref 11–15)
HCT VFR BLD AUTO: 37.3 % (ref 35–48)
HGB BLD-MCNC: 12.7 G/DL (ref 12–16)
IMM GRANULOCYTES # BLD AUTO: 0.05 X10(3) UL (ref 0–1)
IMM GRANULOCYTES NFR BLD: 0.7 %
LYMPHOCYTES # BLD AUTO: 1.76 X10(3) UL (ref 1–4)
LYMPHOCYTES NFR BLD AUTO: 25.1 %
MCH RBC QN AUTO: 29.5 PG (ref 26–34)
MCHC RBC AUTO-ENTMCNC: 34 G/DL (ref 31–37)
MCV RBC AUTO: 86.7 FL (ref 80–100)
MONOCYTES # BLD AUTO: 0.58 X10(3) UL (ref 0.1–1)
MONOCYTES NFR BLD AUTO: 8.3 %
NEUTROPHILS # BLD AUTO: 4.52 X10 (3) UL (ref 1.5–7.7)
NEUTROPHILS # BLD AUTO: 4.52 X10(3) UL (ref 1.5–7.7)
NEUTROPHILS NFR BLD AUTO: 64.6 %
PLATELET # BLD AUTO: 197 10(3)UL (ref 150–450)
RBC # BLD AUTO: 4.3 X10(6)UL (ref 3.8–5.3)
RH BLOOD TYPE: POSITIVE
WBC # BLD AUTO: 7 X10(3) UL (ref 4–11)

## 2025-08-17 PROCEDURE — 86850 RBC ANTIBODY SCREEN: CPT

## 2025-08-17 PROCEDURE — 85025 COMPLETE CBC W/AUTO DIFF WBC: CPT

## 2025-08-17 PROCEDURE — 86901 BLOOD TYPING SEROLOGIC RH(D): CPT

## 2025-08-17 PROCEDURE — 86900 BLOOD TYPING SEROLOGIC ABO: CPT

## 2025-08-17 PROCEDURE — 36415 COLL VENOUS BLD VENIPUNCTURE: CPT

## 2025-08-18 ENCOUNTER — TELEPHONE (OUTPATIENT)
Dept: OBGYN UNIT | Facility: HOSPITAL | Age: 40
End: 2025-08-18

## 2025-08-19 ENCOUNTER — ANESTHESIA (OUTPATIENT)
Dept: OBGYN UNIT | Facility: HOSPITAL | Age: 40
End: 2025-08-19

## 2025-08-19 ENCOUNTER — HOSPITAL ENCOUNTER (INPATIENT)
Facility: HOSPITAL | Age: 40
LOS: 3 days | Discharge: HOME | End: 2025-08-22
Attending: OBSTETRICS & GYNECOLOGY | Admitting: OBSTETRICS & GYNECOLOGY

## 2025-08-19 ENCOUNTER — ANESTHESIA EVENT (OUTPATIENT)
Dept: OBGYN UNIT | Facility: HOSPITAL | Age: 40
End: 2025-08-19

## 2025-08-19 DIAGNOSIS — Z98.891 S/P CESAREAN SECTION: Primary | ICD-10-CM

## 2025-08-19 DIAGNOSIS — E88.819 INSULIN RESISTANCE: ICD-10-CM

## 2025-08-19 LAB
GLUCOSE BLDC GLUCOMTR-MCNC: 124 MG/DL (ref 70–99)
GLUCOSE BLDC GLUCOMTR-MCNC: 128 MG/DL (ref 70–99)
GLUCOSE BLDC GLUCOMTR-MCNC: 134 MG/DL (ref 70–99)
GLUCOSE BLDC GLUCOMTR-MCNC: 141 MG/DL (ref 70–99)
GLUCOSE BLDC GLUCOMTR-MCNC: 226 MG/DL (ref 70–99)
HBV SURFACE AG SER-ACNC: <0.1
HBV SURFACE AG SERPL QL IA: NONREACTIVE
RUBV IGG SER QL: POSITIVE
RUBV IGG SER-ACNC: 31 IU/ML (ref 10–?)
T PALLIDUM AB SER QL IA: NONREACTIVE

## 2025-08-19 PROCEDURE — 82962 GLUCOSE BLOOD TEST: CPT

## 2025-08-19 PROCEDURE — 86762 RUBELLA ANTIBODY: CPT | Performed by: OBSTETRICS & GYNECOLOGY

## 2025-08-19 PROCEDURE — 87340 HEPATITIS B SURFACE AG IA: CPT | Performed by: OBSTETRICS & GYNECOLOGY

## 2025-08-19 PROCEDURE — 88304 TISSUE EXAM BY PATHOLOGIST: CPT | Performed by: OBSTETRICS & GYNECOLOGY

## 2025-08-19 PROCEDURE — 88302 TISSUE EXAM BY PATHOLOGIST: CPT | Performed by: OBSTETRICS & GYNECOLOGY

## 2025-08-19 PROCEDURE — 86780 TREPONEMA PALLIDUM: CPT | Performed by: OBSTETRICS & GYNECOLOGY

## 2025-08-19 RX ORDER — SODIUM CHLORIDE, SODIUM LACTATE, POTASSIUM CHLORIDE, CALCIUM CHLORIDE 600; 310; 30; 20 MG/100ML; MG/100ML; MG/100ML; MG/100ML
INJECTION, SOLUTION INTRAVENOUS CONTINUOUS
Status: DISCONTINUED | OUTPATIENT
Start: 2025-08-19 | End: 2025-08-22

## 2025-08-19 RX ORDER — ACETAMINOPHEN 500 MG
1000 TABLET ORAL EVERY 6 HOURS
Status: DISCONTINUED | OUTPATIENT
Start: 2025-08-19 | End: 2025-08-22

## 2025-08-19 RX ORDER — CITRIC ACID/SODIUM CITRATE 334-500MG
30 SOLUTION, ORAL ORAL ONCE
Status: COMPLETED | OUTPATIENT
Start: 2025-08-19 | End: 2025-08-19

## 2025-08-19 RX ORDER — SIMETHICONE 80 MG
80 TABLET,CHEWABLE ORAL 3 TIMES DAILY PRN
Status: DISCONTINUED | OUTPATIENT
Start: 2025-08-19 | End: 2025-08-22

## 2025-08-19 RX ORDER — ONDANSETRON 2 MG/ML
4 INJECTION INTRAMUSCULAR; INTRAVENOUS EVERY 6 HOURS PRN
Status: DISCONTINUED | OUTPATIENT
Start: 2025-08-19 | End: 2025-08-19

## 2025-08-19 RX ORDER — METOCLOPRAMIDE 10 MG/1
10 TABLET ORAL ONCE
Status: COMPLETED | OUTPATIENT
Start: 2025-08-19 | End: 2025-08-19

## 2025-08-19 RX ORDER — HYDROCODONE BITARTRATE AND ACETAMINOPHEN 7.5; 325 MG/1; MG/1
2 TABLET ORAL EVERY 6 HOURS PRN
Refills: 0 | Status: ACTIVE | OUTPATIENT
Start: 2025-08-19 | End: 2025-08-20

## 2025-08-19 RX ORDER — ONDANSETRON 2 MG/ML
INJECTION INTRAMUSCULAR; INTRAVENOUS AS NEEDED
Status: DISCONTINUED | OUTPATIENT
Start: 2025-08-19 | End: 2025-08-19 | Stop reason: SURG

## 2025-08-19 RX ORDER — AMMONIA 15 % (W/V)
0.3 AMPUL (EA) INHALATION AS NEEDED
Status: DISCONTINUED | OUTPATIENT
Start: 2025-08-19 | End: 2025-08-22

## 2025-08-19 RX ORDER — HYDROCODONE BITARTRATE AND ACETAMINOPHEN 7.5; 325 MG/1; MG/1
1 TABLET ORAL EVERY 6 HOURS PRN
Refills: 0 | Status: ACTIVE | OUTPATIENT
Start: 2025-08-19 | End: 2025-08-20

## 2025-08-19 RX ORDER — BISACODYL 10 MG
10 SUPPOSITORY, RECTAL RECTAL ONCE AS NEEDED
Status: DISCONTINUED | OUTPATIENT
Start: 2025-08-19 | End: 2025-08-22

## 2025-08-19 RX ORDER — DEXAMETHASONE SODIUM PHOSPHATE 4 MG/ML
VIAL (ML) INJECTION AS NEEDED
Status: DISCONTINUED | OUTPATIENT
Start: 2025-08-19 | End: 2025-08-19 | Stop reason: SURG

## 2025-08-19 RX ORDER — NALBUPHINE HYDROCHLORIDE 10 MG/ML
2.5 INJECTION INTRAMUSCULAR; INTRAVENOUS; SUBCUTANEOUS
Status: DISCONTINUED | OUTPATIENT
Start: 2025-08-19 | End: 2025-08-22

## 2025-08-19 RX ORDER — HYDROMORPHONE HYDROCHLORIDE 1 MG/ML
0.4 INJECTION, SOLUTION INTRAMUSCULAR; INTRAVENOUS; SUBCUTANEOUS
Refills: 0 | Status: ACTIVE | OUTPATIENT
Start: 2025-08-19 | End: 2025-08-20

## 2025-08-19 RX ORDER — IBUPROFEN 600 MG/1
600 TABLET, FILM COATED ORAL EVERY 6 HOURS
Status: DISCONTINUED | OUTPATIENT
Start: 2025-08-20 | End: 2025-08-22

## 2025-08-19 RX ORDER — DIPHENHYDRAMINE HCL 25 MG
25 CAPSULE ORAL EVERY 4 HOURS PRN
Status: ACTIVE | OUTPATIENT
Start: 2025-08-19 | End: 2025-08-20

## 2025-08-19 RX ORDER — INSULIN DEGLUDEC 100 U/ML
20 INJECTION, SOLUTION SUBCUTANEOUS NIGHTLY
Status: DISCONTINUED | OUTPATIENT
Start: 2025-08-19 | End: 2025-08-22

## 2025-08-19 RX ORDER — SODIUM CHLORIDE, SODIUM LACTATE, POTASSIUM CHLORIDE, CALCIUM CHLORIDE 600; 310; 30; 20 MG/100ML; MG/100ML; MG/100ML; MG/100ML
125 INJECTION, SOLUTION INTRAVENOUS CONTINUOUS
Status: DISCONTINUED | OUTPATIENT
Start: 2025-08-19 | End: 2025-08-19

## 2025-08-19 RX ORDER — NICOTINE POLACRILEX 4 MG
30 LOZENGE BUCCAL
Status: DISCONTINUED | OUTPATIENT
Start: 2025-08-19 | End: 2025-08-22

## 2025-08-19 RX ORDER — NALOXONE HYDROCHLORIDE 0.4 MG/ML
0.08 INJECTION, SOLUTION INTRAMUSCULAR; INTRAVENOUS; SUBCUTANEOUS
Status: ACTIVE | OUTPATIENT
Start: 2025-08-19 | End: 2025-08-20

## 2025-08-19 RX ORDER — FAMOTIDINE 20 MG/1
20 TABLET, FILM COATED ORAL ONCE
Status: COMPLETED | OUTPATIENT
Start: 2025-08-19 | End: 2025-08-19

## 2025-08-19 RX ORDER — KETOROLAC TROMETHAMINE 30 MG/ML
30 INJECTION, SOLUTION INTRAMUSCULAR; INTRAVENOUS ONCE
Status: COMPLETED | OUTPATIENT
Start: 2025-08-19 | End: 2025-08-19

## 2025-08-19 RX ORDER — PROCHLORPERAZINE EDISYLATE 5 MG/ML
5 INJECTION INTRAMUSCULAR; INTRAVENOUS ONCE AS NEEDED
Status: COMPLETED | OUTPATIENT
Start: 2025-08-19 | End: 2025-08-19

## 2025-08-19 RX ORDER — DEXTROSE MONOHYDRATE 25 G/50ML
50 INJECTION, SOLUTION INTRAVENOUS
Status: DISCONTINUED | OUTPATIENT
Start: 2025-08-19 | End: 2025-08-22

## 2025-08-19 RX ORDER — LEVOTHYROXINE SODIUM 75 UG/1
75 TABLET ORAL
Status: DISCONTINUED | OUTPATIENT
Start: 2025-08-19 | End: 2025-08-22

## 2025-08-19 RX ORDER — NALBUPHINE HYDROCHLORIDE 10 MG/ML
2.5 INJECTION INTRAMUSCULAR; INTRAVENOUS; SUBCUTANEOUS EVERY 4 HOURS PRN
Status: ACTIVE | OUTPATIENT
Start: 2025-08-19 | End: 2025-08-20

## 2025-08-19 RX ORDER — NICOTINE POLACRILEX 4 MG
15 LOZENGE BUCCAL
Status: DISCONTINUED | OUTPATIENT
Start: 2025-08-19 | End: 2025-08-22

## 2025-08-19 RX ORDER — MORPHINE SULFATE 1 MG/ML
INJECTION, SOLUTION EPIDURAL; INTRATHECAL; INTRAVENOUS AS NEEDED
Status: DISCONTINUED | OUTPATIENT
Start: 2025-08-19 | End: 2025-08-19 | Stop reason: SURG

## 2025-08-19 RX ORDER — ACETAMINOPHEN 325 MG/1
650 TABLET ORAL EVERY 6 HOURS PRN
Status: DISPENSED | OUTPATIENT
Start: 2025-08-19 | End: 2025-08-20

## 2025-08-19 RX ORDER — KETOROLAC TROMETHAMINE 30 MG/ML
30 INJECTION, SOLUTION INTRAMUSCULAR; INTRAVENOUS EVERY 6 HOURS
Status: DISPENSED | OUTPATIENT
Start: 2025-08-19 | End: 2025-08-20

## 2025-08-19 RX ORDER — FAMOTIDINE 10 MG/ML
20 INJECTION, SOLUTION INTRAVENOUS ONCE
Status: COMPLETED | OUTPATIENT
Start: 2025-08-19 | End: 2025-08-19

## 2025-08-19 RX ORDER — ENOXAPARIN SODIUM 100 MG/ML
40 INJECTION SUBCUTANEOUS DAILY
Status: DISCONTINUED | OUTPATIENT
Start: 2025-08-19 | End: 2025-08-22

## 2025-08-19 RX ORDER — HALOPERIDOL 5 MG/ML
0.5 INJECTION INTRAMUSCULAR ONCE AS NEEDED
Status: ACTIVE | OUTPATIENT
Start: 2025-08-19 | End: 2025-08-19

## 2025-08-19 RX ORDER — METOCLOPRAMIDE HYDROCHLORIDE 5 MG/ML
10 INJECTION INTRAMUSCULAR; INTRAVENOUS ONCE
Status: COMPLETED | OUTPATIENT
Start: 2025-08-19 | End: 2025-08-19

## 2025-08-19 RX ORDER — ONDANSETRON 2 MG/ML
4 INJECTION INTRAMUSCULAR; INTRAVENOUS ONCE AS NEEDED
Status: ACTIVE | OUTPATIENT
Start: 2025-08-19 | End: 2025-08-19

## 2025-08-19 RX ORDER — DOCUSATE SODIUM 100 MG/1
100 CAPSULE, LIQUID FILLED ORAL
Status: DISCONTINUED | OUTPATIENT
Start: 2025-08-19 | End: 2025-08-22

## 2025-08-19 RX ORDER — DIPHENHYDRAMINE HYDROCHLORIDE 50 MG/ML
12.5 INJECTION, SOLUTION INTRAMUSCULAR; INTRAVENOUS EVERY 4 HOURS PRN
Status: ACTIVE | OUTPATIENT
Start: 2025-08-19 | End: 2025-08-20

## 2025-08-19 RX ORDER — GABAPENTIN 300 MG/1
300 CAPSULE ORAL EVERY 8 HOURS PRN
Status: DISCONTINUED | OUTPATIENT
Start: 2025-08-19 | End: 2025-08-22

## 2025-08-19 RX ORDER — HYDROMORPHONE HYDROCHLORIDE 1 MG/ML
0.6 INJECTION, SOLUTION INTRAMUSCULAR; INTRAVENOUS; SUBCUTANEOUS
Refills: 0 | Status: ACTIVE | OUTPATIENT
Start: 2025-08-19 | End: 2025-08-20

## 2025-08-19 RX ORDER — ACETAMINOPHEN 500 MG
1000 TABLET ORAL ONCE
Status: COMPLETED | OUTPATIENT
Start: 2025-08-19 | End: 2025-08-19

## 2025-08-19 RX ORDER — BUPIVACAINE HYDROCHLORIDE 7.5 MG/ML
INJECTION, SOLUTION INTRASPINAL AS NEEDED
Status: DISCONTINUED | OUTPATIENT
Start: 2025-08-19 | End: 2025-08-19 | Stop reason: SURG

## 2025-08-19 RX ORDER — OXYCODONE HYDROCHLORIDE 5 MG/1
5 TABLET ORAL EVERY 6 HOURS PRN
Status: DISCONTINUED | OUTPATIENT
Start: 2025-08-19 | End: 2025-08-22

## 2025-08-19 RX ORDER — PHENYLEPHRINE HCL 10 MG/ML
VIAL (ML) INJECTION AS NEEDED
Status: DISCONTINUED | OUTPATIENT
Start: 2025-08-19 | End: 2025-08-19 | Stop reason: SURG

## 2025-08-19 RX ORDER — ONDANSETRON 2 MG/ML
4 INJECTION INTRAMUSCULAR; INTRAVENOUS EVERY 6 HOURS PRN
Status: DISCONTINUED | OUTPATIENT
Start: 2025-08-19 | End: 2025-08-22

## 2025-08-19 RX ORDER — LIDOCAINE HYDROCHLORIDE 10 MG/ML
INJECTION, SOLUTION EPIDURAL; INFILTRATION; INTRACAUDAL; PERINEURAL AS NEEDED
Status: DISCONTINUED | OUTPATIENT
Start: 2025-08-19 | End: 2025-08-19 | Stop reason: SURG

## 2025-08-19 RX ORDER — SODIUM CHLORIDE, SODIUM LACTATE, POTASSIUM CHLORIDE, CALCIUM CHLORIDE 600; 310; 30; 20 MG/100ML; MG/100ML; MG/100ML; MG/100ML
INJECTION, SOLUTION INTRAVENOUS CONTINUOUS PRN
Status: DISCONTINUED | OUTPATIENT
Start: 2025-08-19 | End: 2025-08-19 | Stop reason: SURG

## 2025-08-19 RX ADMIN — SODIUM CHLORIDE, SODIUM LACTATE, POTASSIUM CHLORIDE, CALCIUM CHLORIDE: 600; 310; 30; 20 INJECTION, SOLUTION INTRAVENOUS at 08:13:00

## 2025-08-19 RX ADMIN — SODIUM CHLORIDE, SODIUM LACTATE, POTASSIUM CHLORIDE, CALCIUM CHLORIDE: 600; 310; 30; 20 INJECTION, SOLUTION INTRAVENOUS at 07:34:00

## 2025-08-19 RX ADMIN — DEXAMETHASONE SODIUM PHOSPHATE 4 MG: 4 MG/ML VIAL (ML) INJECTION at 08:08:00

## 2025-08-19 RX ADMIN — SODIUM CHLORIDE, SODIUM LACTATE, POTASSIUM CHLORIDE, CALCIUM CHLORIDE: 600; 310; 30; 20 INJECTION, SOLUTION INTRAVENOUS at 08:41:00

## 2025-08-19 RX ADMIN — PHENYLEPHRINE HCL 100 MCG: 10 MG/ML VIAL (ML) INJECTION at 07:51:00

## 2025-08-19 RX ADMIN — BUPIVACAINE HYDROCHLORIDE 1.6 ML: 7.5 INJECTION, SOLUTION INTRASPINAL at 07:44:00

## 2025-08-19 RX ADMIN — ONDANSETRON 4 MG: 2 INJECTION INTRAMUSCULAR; INTRAVENOUS at 08:08:00

## 2025-08-19 RX ADMIN — PHENYLEPHRINE HCL 100 MCG: 10 MG/ML VIAL (ML) INJECTION at 08:00:00

## 2025-08-19 RX ADMIN — LIDOCAINE HYDROCHLORIDE 3 ML: 10 INJECTION, SOLUTION EPIDURAL; INFILTRATION; INTRACAUDAL; PERINEURAL at 07:42:00

## 2025-08-19 RX ADMIN — MORPHINE SULFATE 0.3 MG: 1 INJECTION, SOLUTION EPIDURAL; INTRATHECAL; INTRAVENOUS at 07:44:00

## 2025-08-20 LAB
BASOPHILS # BLD AUTO: 0.05 X10(3) UL (ref 0–0.2)
BASOPHILS NFR BLD AUTO: 0.5 %
DEPRECATED RDW RBC AUTO: 41.8 FL (ref 35.1–46.3)
EOSINOPHIL # BLD AUTO: 0.03 X10(3) UL (ref 0–0.7)
EOSINOPHIL NFR BLD AUTO: 0.3 %
ERYTHROCYTE [DISTWIDTH] IN BLOOD BY AUTOMATED COUNT: 13.6 % (ref 11–15)
GLUCOSE BLDC GLUCOMTR-MCNC: 100 MG/DL (ref 70–99)
GLUCOSE BLDC GLUCOMTR-MCNC: 176 MG/DL (ref 70–99)
GLUCOSE BLDC GLUCOMTR-MCNC: 84 MG/DL (ref 70–99)
GLUCOSE BLDC GLUCOMTR-MCNC: 88 MG/DL (ref 70–99)
HCT VFR BLD AUTO: 32.3 % (ref 35–48)
HGB BLD-MCNC: 11 G/DL (ref 12–16)
IMM GRANULOCYTES # BLD AUTO: 0.06 X10(3) UL (ref 0–1)
IMM GRANULOCYTES NFR BLD: 0.6 %
LYMPHOCYTES # BLD AUTO: 2.27 X10(3) UL (ref 1–4)
LYMPHOCYTES NFR BLD AUTO: 21.6 %
MCH RBC QN AUTO: 29.3 PG (ref 26–34)
MCHC RBC AUTO-ENTMCNC: 34.1 G/DL (ref 31–37)
MCV RBC AUTO: 86.1 FL (ref 80–100)
MONOCYTES # BLD AUTO: 1.03 X10(3) UL (ref 0.1–1)
MONOCYTES NFR BLD AUTO: 9.8 %
NEUTROPHILS # BLD AUTO: 7.06 X10 (3) UL (ref 1.5–7.7)
NEUTROPHILS # BLD AUTO: 7.06 X10(3) UL (ref 1.5–7.7)
NEUTROPHILS NFR BLD AUTO: 67.2 %
PLATELET # BLD AUTO: 172 10(3)UL (ref 150–450)
RBC # BLD AUTO: 3.75 X10(6)UL (ref 3.8–5.3)
WBC # BLD AUTO: 10.5 X10(3) UL (ref 4–11)

## 2025-08-20 PROCEDURE — 85025 COMPLETE CBC W/AUTO DIFF WBC: CPT | Performed by: OBSTETRICS & GYNECOLOGY

## 2025-08-20 PROCEDURE — 82962 GLUCOSE BLOOD TEST: CPT

## 2025-08-21 VITALS
OXYGEN SATURATION: 99 % | DIASTOLIC BLOOD PRESSURE: 85 MMHG | WEIGHT: 237 LBS | RESPIRATION RATE: 20 BRPM | HEART RATE: 100 BPM | BODY MASS INDEX: 41 KG/M2 | SYSTOLIC BLOOD PRESSURE: 134 MMHG | TEMPERATURE: 99 F

## 2025-08-21 LAB
GLUCOSE BLDC GLUCOMTR-MCNC: 112 MG/DL (ref 70–99)
GLUCOSE BLDC GLUCOMTR-MCNC: 172 MG/DL (ref 70–99)
GLUCOSE BLDC GLUCOMTR-MCNC: 80 MG/DL (ref 70–99)
GLUCOSE BLDC GLUCOMTR-MCNC: 89 MG/DL (ref 70–99)

## 2025-08-21 PROCEDURE — 82962 GLUCOSE BLOOD TEST: CPT

## 2025-08-22 LAB
GLUCOSE BLDC GLUCOMTR-MCNC: 100 MG/DL (ref 70–99)
GLUCOSE BLDC GLUCOMTR-MCNC: 105 MG/DL (ref 70–99)

## 2025-08-22 PROCEDURE — 82962 GLUCOSE BLOOD TEST: CPT

## 2025-08-22 RX ORDER — OXYCODONE HYDROCHLORIDE 5 MG/1
5 TABLET ORAL EVERY 6 HOURS PRN
Qty: 10 TABLET | Refills: 0 | Status: SHIPPED | OUTPATIENT
Start: 2025-08-22

## 2025-08-22 RX ORDER — IBUPROFEN 600 MG/1
600 TABLET, FILM COATED ORAL EVERY 6 HOURS
Qty: 60 TABLET | Refills: 0 | Status: SHIPPED | OUTPATIENT
Start: 2025-08-22

## 2025-08-22 RX ORDER — GABAPENTIN 300 MG/1
300 CAPSULE ORAL EVERY 8 HOURS PRN
Qty: 30 CAPSULE | Refills: 0 | Status: SHIPPED | OUTPATIENT
Start: 2025-08-22

## 2025-08-22 RX ORDER — ACETAMINOPHEN 500 MG
1000 TABLET ORAL EVERY 6 HOURS
Qty: 60 TABLET | Refills: 0 | Status: SHIPPED | COMMUNITY
Start: 2025-08-22

## (undated) DEVICE — PEN 6" SURGICAL MARKING PURPL

## (undated) DEVICE — Device